# Patient Record
Sex: FEMALE | Race: WHITE | NOT HISPANIC OR LATINO | URBAN - METROPOLITAN AREA
[De-identification: names, ages, dates, MRNs, and addresses within clinical notes are randomized per-mention and may not be internally consistent; named-entity substitution may affect disease eponyms.]

---

## 2018-06-20 ENCOUNTER — EMERGENCY (EMERGENCY)
Facility: HOSPITAL | Age: 78
LOS: 0 days | Discharge: LEFT BEFORE DISCHARGE | End: 2018-06-20
Attending: EMERGENCY MEDICINE | Admitting: EMERGENCY MEDICINE
Payer: COMMERCIAL

## 2018-06-20 VITALS
DIASTOLIC BLOOD PRESSURE: 84 MMHG | TEMPERATURE: 98 F | HEART RATE: 120 BPM | OXYGEN SATURATION: 94 % | SYSTOLIC BLOOD PRESSURE: 193 MMHG | RESPIRATION RATE: 20 BRPM | WEIGHT: 240.08 LBS | HEIGHT: 62 IN

## 2018-06-20 VITALS
HEART RATE: 113 BPM | DIASTOLIC BLOOD PRESSURE: 110 MMHG | SYSTOLIC BLOOD PRESSURE: 209 MMHG | OXYGEN SATURATION: 99 % | RESPIRATION RATE: 18 BRPM

## 2018-06-20 DIAGNOSIS — Z90.710 ACQUIRED ABSENCE OF BOTH CERVIX AND UTERUS: ICD-10-CM

## 2018-06-20 DIAGNOSIS — Z90.710 ACQUIRED ABSENCE OF BOTH CERVIX AND UTERUS: Chronic | ICD-10-CM

## 2018-06-20 DIAGNOSIS — J44.9 CHRONIC OBSTRUCTIVE PULMONARY DISEASE, UNSPECIFIED: ICD-10-CM

## 2018-06-20 DIAGNOSIS — I10 ESSENTIAL (PRIMARY) HYPERTENSION: ICD-10-CM

## 2018-06-20 DIAGNOSIS — M79.89 OTHER SPECIFIED SOFT TISSUE DISORDERS: ICD-10-CM

## 2018-06-20 DIAGNOSIS — E11.65 TYPE 2 DIABETES MELLITUS WITH HYPERGLYCEMIA: ICD-10-CM

## 2018-06-20 DIAGNOSIS — Z87.891 PERSONAL HISTORY OF NICOTINE DEPENDENCE: ICD-10-CM

## 2018-06-20 LAB
ALBUMIN SERPL ELPH-MCNC: 3.8 G/DL — SIGNIFICANT CHANGE UP (ref 3.5–5.2)
ALP SERPL-CCNC: 130 U/L — HIGH (ref 30–115)
ALT FLD-CCNC: 13 U/L — SIGNIFICANT CHANGE UP (ref 0–41)
ANION GAP SERPL CALC-SCNC: 13 MMOL/L — SIGNIFICANT CHANGE UP (ref 7–14)
APPEARANCE UR: CLEAR — SIGNIFICANT CHANGE UP
AST SERPL-CCNC: 16 U/L — SIGNIFICANT CHANGE UP (ref 0–41)
BILIRUB SERPL-MCNC: 1 MG/DL — SIGNIFICANT CHANGE UP (ref 0.2–1.2)
BILIRUB UR-MCNC: NEGATIVE — SIGNIFICANT CHANGE UP
BUN SERPL-MCNC: 19 MG/DL — SIGNIFICANT CHANGE UP (ref 10–20)
CALCIUM SERPL-MCNC: 9.3 MG/DL — SIGNIFICANT CHANGE UP (ref 8.5–10.1)
CHLORIDE SERPL-SCNC: 100 MMOL/L — SIGNIFICANT CHANGE UP (ref 98–110)
CK MB CFR SERPL CALC: 4.6 NG/ML — SIGNIFICANT CHANGE UP (ref 0.6–6.3)
CK SERPL-CCNC: 179 U/L — SIGNIFICANT CHANGE UP (ref 0–225)
CO2 SERPL-SCNC: 25 MMOL/L — SIGNIFICANT CHANGE UP (ref 17–32)
COLOR SPEC: YELLOW — SIGNIFICANT CHANGE UP
CREAT SERPL-MCNC: 1.1 MG/DL — SIGNIFICANT CHANGE UP (ref 0.7–1.5)
DIFF PNL FLD: (no result)
EPI CELLS # UR: (no result) /HPF
GLUCOSE SERPL-MCNC: 227 MG/DL — HIGH (ref 70–99)
GLUCOSE UR QL: 100 MG/DL
HCT VFR BLD CALC: 39.7 % — SIGNIFICANT CHANGE UP (ref 37–47)
HGB BLD-MCNC: 13 G/DL — SIGNIFICANT CHANGE UP (ref 12–16)
KETONES UR-MCNC: NEGATIVE — SIGNIFICANT CHANGE UP
LEUKOCYTE ESTERASE UR-ACNC: NEGATIVE — SIGNIFICANT CHANGE UP
MCHC RBC-ENTMCNC: 30.2 PG — SIGNIFICANT CHANGE UP (ref 27–31)
MCHC RBC-ENTMCNC: 32.7 G/DL — SIGNIFICANT CHANGE UP (ref 32–37)
MCV RBC AUTO: 92.1 FL — SIGNIFICANT CHANGE UP (ref 81–99)
NITRITE UR-MCNC: NEGATIVE — SIGNIFICANT CHANGE UP
NRBC # BLD: 0 /100 WBCS — SIGNIFICANT CHANGE UP (ref 0–0)
NT-PROBNP SERPL-SCNC: 158 PG/ML — SIGNIFICANT CHANGE UP (ref 0–300)
PH UR: 6.5 — SIGNIFICANT CHANGE UP (ref 5–8)
PLATELET # BLD AUTO: 387 K/UL — SIGNIFICANT CHANGE UP (ref 130–400)
POTASSIUM SERPL-MCNC: 3.9 MMOL/L — SIGNIFICANT CHANGE UP (ref 3.5–5)
POTASSIUM SERPL-SCNC: 3.9 MMOL/L — SIGNIFICANT CHANGE UP (ref 3.5–5)
PROT SERPL-MCNC: 7.1 G/DL — SIGNIFICANT CHANGE UP (ref 6–8)
PROT UR-MCNC: NEGATIVE MG/DL — SIGNIFICANT CHANGE UP
RBC # BLD: 4.31 M/UL — SIGNIFICANT CHANGE UP (ref 4.2–5.4)
RBC # FLD: 13.6 % — SIGNIFICANT CHANGE UP (ref 11.5–14.5)
RBC CASTS # UR COMP ASSIST: SIGNIFICANT CHANGE UP /HPF
SODIUM SERPL-SCNC: 138 MMOL/L — SIGNIFICANT CHANGE UP (ref 135–146)
SP GR SPEC: 1.02 — SIGNIFICANT CHANGE UP (ref 1.01–1.03)
TROPONIN T SERPL-MCNC: <0.01 NG/ML — SIGNIFICANT CHANGE UP
UROBILINOGEN FLD QL: 0.2 MG/DL — SIGNIFICANT CHANGE UP (ref 0.2–0.2)
WBC # BLD: 8.18 K/UL — SIGNIFICANT CHANGE UP (ref 4.8–10.8)
WBC # FLD AUTO: 8.18 K/UL — SIGNIFICANT CHANGE UP (ref 4.8–10.8)
WBC UR QL: SIGNIFICANT CHANGE UP /HPF

## 2018-06-20 PROCEDURE — 93970 EXTREMITY STUDY: CPT | Mod: 26

## 2018-06-20 RX ORDER — FUROSEMIDE 40 MG
40 TABLET ORAL ONCE
Qty: 0 | Refills: 0 | Status: COMPLETED | OUTPATIENT
Start: 2018-06-20 | End: 2018-06-20

## 2018-06-20 RX ORDER — ACETAMINOPHEN 500 MG
650 TABLET ORAL ONCE
Qty: 0 | Refills: 0 | Status: DISCONTINUED | OUTPATIENT
Start: 2018-06-20 | End: 2018-06-20

## 2018-06-20 RX ADMIN — Medication 40 MILLIGRAM(S): at 18:17

## 2018-06-20 NOTE — ED ADULT NURSE NOTE - BREATHING, MLM
VSS. Discharge instructions reviewed with patient and wife and copy of instructions sent home with patient. Dr. Roxanne Fox spoke with patient and wife prior to discharge. Questions answered. Discharged via car, wheeled out by [employer]. IV discontinued prior to discharge.      Personal items with patient at discharge: clothing
Spontaneous, unlabored and symmetrical

## 2018-06-20 NOTE — ED ADULT NURSE NOTE - EXPLANATION OF PATIENT'S REASON FOR LEAVING
patient was recommended to be admitted, patient stated to medical team that she does not want to be admitted, daughter at bedside

## 2018-06-20 NOTE — ED PROVIDER NOTE - PROGRESS NOTE DETAILS
advised to follow up patient and family regarding her hyperglycemia, given copies of results.    We have discussed indications to return. Patient persistently tachycardic. Discussed admission for monitoring. Patient refuses to stay. Wishes to leave AMA. Aware of risk of severe illness including death.

## 2018-06-20 NOTE — ED PROVIDER NOTE - PHYSICAL EXAMINATION
Gen: Alert, NAD, well appearing  Head: NC, AT, PERRL, EOMI, normal lids/conjunctiva  Neck: +supple, no tenderness/meningismus,  Pulm: CTAB  Heart: tachy, +murmur  Abd: soft, NT/ND, +BS  Mskel: +swelling b/l lower extremities. No calf tendneress.  No erythema/cyanosis  Skin: no rash, warm/dry  Neuro: AAOx3, no sensory/motor deficits

## 2018-06-20 NOTE — ED ADULT NURSE NOTE - ED STAT RN HANDOFF DETAILS
Vital signs reviewed with MD Patiño and ALESSIA Faustin, aware of hypertension and tachycardia, patient was seen by medical team and offered admission patient states she wants to sign against medical advise, patient was educated on risks associated with doing so, verbalized understanding but continued AMA

## 2018-06-20 NOTE — ED PROVIDER NOTE - NS ED ROS FT
Review of Systems    Constitutional: (-) fever  Eyes/ENT: (-) blurry vision, (-) epistaxis  Cardiovascular: (-) chest pain, (-) syncope  Respiratory: (-) cough, (-) shortness of breath  Gastrointestinal: (-) vomiting, (-) diarrhea  Musculoskeletal: (+) leg swelling (-) neck pain, (-) back pain, (-) joint pain  Integumentary: (-) rash, (-) edema  Neurological: (-) headache, (-) altered mental status

## 2018-06-20 NOTE — ED PROVIDER NOTE - ATTENDING CONTRIBUTION TO CARE
I personally evaluated the patient. I reviewed the Resident’s or Physician Assistant’s note (as assigned above), and agree with the findings and plan except as documented in my note. 76yo F Pt comes in c/o b/l leg swelling. Pt has been on her feel doing chores and noticed that her legs were swollen. Pt denies any CP, SOB, fever, chills. b/l pitting edema to level of knee, chest clear On exam: NCAT. PERRLA, EOMI. OP clear. Lungs CTAB. RRR, S1S2 noted. Radial pulses 2+ and equal, pedal pulses 2+ and equal. Abdomen soft, NT/ND, no rebound or guarding. FROM x4 extremities. No focal neuro deficits. Plan: CXR, Labs, diuretics

## 2018-06-20 NOTE — ED PROVIDER NOTE - OBJECTIVE STATEMENT
hx from patient and daughter  76 yo female  hx of COPD, HTN, schizophrenia c/o increasing leg swelling. Patient has had leg swelling for months and noticed increased swelling to legs. No fevers, CP, SOB, or abdominal pains.

## 2018-09-22 ENCOUNTER — EMERGENCY (EMERGENCY)
Facility: HOSPITAL | Age: 78
LOS: 0 days | Discharge: HOME | End: 2018-09-22
Attending: EMERGENCY MEDICINE | Admitting: EMERGENCY MEDICINE

## 2018-09-22 VITALS
SYSTOLIC BLOOD PRESSURE: 195 MMHG | HEART RATE: 98 BPM | HEIGHT: 62 IN | TEMPERATURE: 98 F | RESPIRATION RATE: 18 BRPM | DIASTOLIC BLOOD PRESSURE: 117 MMHG | WEIGHT: 240.08 LBS | OXYGEN SATURATION: 97 %

## 2018-09-22 VITALS — DIASTOLIC BLOOD PRESSURE: 98 MMHG | SYSTOLIC BLOOD PRESSURE: 147 MMHG

## 2018-09-22 DIAGNOSIS — Z90.710 ACQUIRED ABSENCE OF BOTH CERVIX AND UTERUS: ICD-10-CM

## 2018-09-22 DIAGNOSIS — Z90.710 ACQUIRED ABSENCE OF BOTH CERVIX AND UTERUS: Chronic | ICD-10-CM

## 2018-09-22 DIAGNOSIS — J44.9 CHRONIC OBSTRUCTIVE PULMONARY DISEASE, UNSPECIFIED: ICD-10-CM

## 2018-09-22 DIAGNOSIS — I10 ESSENTIAL (PRIMARY) HYPERTENSION: ICD-10-CM

## 2018-09-22 DIAGNOSIS — R42 DIZZINESS AND GIDDINESS: ICD-10-CM

## 2018-09-22 DIAGNOSIS — F20.9 SCHIZOPHRENIA, UNSPECIFIED: ICD-10-CM

## 2018-09-22 DIAGNOSIS — Z79.899 OTHER LONG TERM (CURRENT) DRUG THERAPY: ICD-10-CM

## 2018-09-22 DIAGNOSIS — R60.0 LOCALIZED EDEMA: ICD-10-CM

## 2018-09-22 PROBLEM — F25.0 SCHIZOAFFECTIVE DISORDER, BIPOLAR TYPE: Chronic | Status: ACTIVE | Noted: 2018-06-20

## 2018-09-22 LAB
ALBUMIN SERPL ELPH-MCNC: 4.5 G/DL — SIGNIFICANT CHANGE UP (ref 3.5–5.2)
ALP SERPL-CCNC: 109 U/L — SIGNIFICANT CHANGE UP (ref 30–115)
ALT FLD-CCNC: 15 U/L — SIGNIFICANT CHANGE UP (ref 0–41)
ANION GAP SERPL CALC-SCNC: 14 MMOL/L — SIGNIFICANT CHANGE UP (ref 7–14)
APPEARANCE UR: CLEAR — SIGNIFICANT CHANGE UP
AST SERPL-CCNC: 23 U/L — SIGNIFICANT CHANGE UP (ref 0–41)
BILIRUB SERPL-MCNC: 0.5 MG/DL — SIGNIFICANT CHANGE UP (ref 0.2–1.2)
BILIRUB UR-MCNC: NEGATIVE — SIGNIFICANT CHANGE UP
BUN SERPL-MCNC: 16 MG/DL — SIGNIFICANT CHANGE UP (ref 10–20)
CALCIUM SERPL-MCNC: 10.1 MG/DL — SIGNIFICANT CHANGE UP (ref 8.5–10.1)
CHLORIDE SERPL-SCNC: 97 MMOL/L — LOW (ref 98–110)
CO2 SERPL-SCNC: 27 MMOL/L — SIGNIFICANT CHANGE UP (ref 17–32)
COLOR SPEC: YELLOW — SIGNIFICANT CHANGE UP
CREAT SERPL-MCNC: 1.1 MG/DL — SIGNIFICANT CHANGE UP (ref 0.7–1.5)
DIFF PNL FLD: ABNORMAL
EPI CELLS # UR: ABNORMAL /HPF
GLUCOSE SERPL-MCNC: 127 MG/DL — HIGH (ref 70–99)
GLUCOSE UR QL: NEGATIVE MG/DL — SIGNIFICANT CHANGE UP
HCT VFR BLD CALC: 44.8 % — SIGNIFICANT CHANGE UP (ref 37–47)
HGB BLD-MCNC: 14.3 G/DL — SIGNIFICANT CHANGE UP (ref 12–16)
KETONES UR-MCNC: NEGATIVE — SIGNIFICANT CHANGE UP
LEUKOCYTE ESTERASE UR-ACNC: ABNORMAL
MCHC RBC-ENTMCNC: 29.5 PG — SIGNIFICANT CHANGE UP (ref 27–31)
MCHC RBC-ENTMCNC: 31.9 G/DL — LOW (ref 32–37)
MCV RBC AUTO: 92.4 FL — SIGNIFICANT CHANGE UP (ref 81–99)
NITRITE UR-MCNC: NEGATIVE — SIGNIFICANT CHANGE UP
NRBC # BLD: 0 /100 WBCS — SIGNIFICANT CHANGE UP (ref 0–0)
NT-PROBNP SERPL-SCNC: 170 PG/ML — SIGNIFICANT CHANGE UP (ref 0–300)
PH UR: 7 — SIGNIFICANT CHANGE UP (ref 5–8)
PLATELET # BLD AUTO: 436 K/UL — HIGH (ref 130–400)
POTASSIUM SERPL-MCNC: 4.6 MMOL/L — SIGNIFICANT CHANGE UP (ref 3.5–5)
POTASSIUM SERPL-SCNC: 4.6 MMOL/L — SIGNIFICANT CHANGE UP (ref 3.5–5)
PROT SERPL-MCNC: 8.1 G/DL — HIGH (ref 6–8)
PROT UR-MCNC: NEGATIVE MG/DL — SIGNIFICANT CHANGE UP
RBC # BLD: 4.85 M/UL — SIGNIFICANT CHANGE UP (ref 4.2–5.4)
RBC # FLD: 14.4 % — SIGNIFICANT CHANGE UP (ref 11.5–14.5)
RBC CASTS # UR COMP ASSIST: ABNORMAL /HPF
SODIUM SERPL-SCNC: 138 MMOL/L — SIGNIFICANT CHANGE UP (ref 135–146)
SP GR SPEC: 1.01 — SIGNIFICANT CHANGE UP (ref 1.01–1.03)
TROPONIN T SERPL-MCNC: <0.01 NG/ML — SIGNIFICANT CHANGE UP
UROBILINOGEN FLD QL: 0.2 MG/DL — SIGNIFICANT CHANGE UP (ref 0.2–0.2)
WBC # BLD: 8.58 K/UL — SIGNIFICANT CHANGE UP (ref 4.8–10.8)
WBC # FLD AUTO: 8.58 K/UL — SIGNIFICANT CHANGE UP (ref 4.8–10.8)
WBC UR QL: SIGNIFICANT CHANGE UP /HPF

## 2018-09-22 RX ORDER — FLUPHENAZINE HYDROCHLORIDE 1 MG/1
37.5 TABLET, FILM COATED ORAL ONCE
Qty: 0 | Refills: 0 | Status: COMPLETED | OUTPATIENT
Start: 2018-09-22 | End: 2018-09-22

## 2018-09-22 RX ORDER — METOPROLOL TARTRATE 50 MG
50 TABLET ORAL ONCE
Qty: 0 | Refills: 0 | Status: COMPLETED | OUTPATIENT
Start: 2018-09-22 | End: 2018-09-22

## 2018-09-22 RX ADMIN — Medication 50 MILLIGRAM(S): at 15:52

## 2018-09-22 RX ADMIN — FLUPHENAZINE HYDROCHLORIDE 37.5 MILLIGRAM(S): 1 TABLET, FILM COATED ORAL at 15:52

## 2018-09-22 NOTE — ED ADULT NURSE NOTE - NSIMPLEMENTINTERV_GEN_ALL_ED
Implemented All Universal Safety Interventions:  [place] to call system. Call bell, personal items and telephone within reach. Instruct patient to call for assistance. Room bathroom lighting operational. Non-slip footwear when patient is off stretcher. Physically safe environment: no spills, clutter or unnecessary equipment. Stretcher in lowest position, wheels locked, appropriate side rails in place.

## 2018-09-22 NOTE — ED PROVIDER NOTE - ATTENDING CONTRIBUTION TO CARE
I have personally performed a history and physical exam on this patient and personally directed the management of the patient. Patient presents for evaluation of dizziness, she has a history of shizophrenia and her history is unreliable information corroborated by her daughter, she denies any fevers or chills she denies any vomiting she franc any headache, visual changes or focal deficits, her daughters main concern is the patient non-compliance with medication as well as refusal to take.  On physical exam the patient is nc/at perrla eomi oropharynx clear cta b/l, rrr s1s2 noted abd-soft nt ndbs+e xt from with no edema noted. she has no focal deficits she is able to ambulate well.  A/P- we obtained labs ct head which is negative we have treated her bp with po medication and it has responded well the patient improved here in the Ed I will discharge we discussed indications to return to the Ed and overall have stressed a low thrshold for return

## 2018-09-22 NOTE — ED PROVIDER NOTE - OBJECTIVE STATEMENT
hx from patient and daughter  77 yo female hx of schizophrenia, COPD brought in by daughter after patient c/o of lightheaded, dizzy, and feeling off balance today. Patient also with edema to her legs. Patient is non compliant with meds. Patient denies any symptoms. No CP, SOB, HA, dizzy, or abdominal pains.

## 2018-09-22 NOTE — ED PROVIDER NOTE - PROGRESS NOTE DETAILS
Per daughter patient non compliant with Resperidol, last dose 2 weeks ago. Dr. Patiño spoke with patient's doctor who recommended Prolixin 37.5mg IM

## 2018-09-22 NOTE — ED PROVIDER NOTE - NS ED ROS FT
Review of Systems    Constitutional: (-) fever  Eyes/ENT: (-) blurry vision, (-) epistaxis  Cardiovascular: (-) chest pain, (-) syncope  Respiratory: (-) cough, (-) shortness of breath  Gastrointestinal: (-) vomiting, (-) diarrhea  Musculoskeletal: (-) neck pain, (-) back pain, (-) joint pain  Integumentary: (-) rash, (-) edema  Neurological: (-) headache, (-) altered mental status, (+) dizzy, (+) offbalance  Psychiatric: (-) hallucinations  Allergic/Immunologic: (-) pruritus

## 2018-09-22 NOTE — ED PROVIDER NOTE - PHYSICAL EXAMINATION
Gen: Alert, NAD, well appearing  Head: NC, AT, PERRL, EOMI, normal lids/conjunctiva  ENT: normal hearing, patent oropharynx   Neck: +supple, no tenderness/meningismus,  Pulm: Bilateral BS, normal resp effort, no wheeze/stridor/retractions  CV: S1S2, RRR  Abd: soft, NT/ND  Mskel: +2 swelling b/l LE. No erythema/cyanosis  Skin: no rash, warm/dry  Neuro: AAOx3, no sensory/motor deficits

## 2019-07-07 ENCOUNTER — INPATIENT (INPATIENT)
Facility: HOSPITAL | Age: 79
LOS: 7 days | Discharge: HOME HEALTH | End: 2019-07-15
Attending: INTERNAL MEDICINE | Admitting: INTERNAL MEDICINE
Payer: MEDICARE

## 2019-07-07 VITALS
HEART RATE: 110 BPM | OXYGEN SATURATION: 96 % | RESPIRATION RATE: 20 BRPM | TEMPERATURE: 99 F | DIASTOLIC BLOOD PRESSURE: 74 MMHG | SYSTOLIC BLOOD PRESSURE: 164 MMHG

## 2019-07-07 DIAGNOSIS — Z90.710 ACQUIRED ABSENCE OF BOTH CERVIX AND UTERUS: Chronic | ICD-10-CM

## 2019-07-07 LAB
ALBUMIN SERPL ELPH-MCNC: 3.5 G/DL — SIGNIFICANT CHANGE UP (ref 3.5–5.2)
ALBUMIN SERPL ELPH-MCNC: 3.7 G/DL — SIGNIFICANT CHANGE UP (ref 3.5–5.2)
ALP SERPL-CCNC: 127 U/L — HIGH (ref 30–115)
ALP SERPL-CCNC: 136 U/L — HIGH (ref 30–115)
ALT FLD-CCNC: 127 U/L — HIGH (ref 0–41)
ALT FLD-CCNC: 133 U/L — HIGH (ref 0–41)
ANION GAP SERPL CALC-SCNC: 16 MMOL/L — HIGH (ref 7–14)
ANION GAP SERPL CALC-SCNC: 17 MMOL/L — HIGH (ref 7–14)
APPEARANCE UR: ABNORMAL
APTT BLD: 28.7 SEC — SIGNIFICANT CHANGE UP (ref 27–39.2)
AST SERPL-CCNC: 107 U/L — HIGH (ref 0–41)
AST SERPL-CCNC: 118 U/L — HIGH (ref 0–41)
BASOPHILS # BLD AUTO: 0.03 K/UL — SIGNIFICANT CHANGE UP (ref 0–0.2)
BASOPHILS NFR BLD AUTO: 0.2 % — SIGNIFICANT CHANGE UP (ref 0–1)
BILIRUB SERPL-MCNC: 1.3 MG/DL — HIGH (ref 0.2–1.2)
BILIRUB SERPL-MCNC: 1.4 MG/DL — HIGH (ref 0.2–1.2)
BILIRUB UR-MCNC: ABNORMAL
BUN SERPL-MCNC: 22 MG/DL — HIGH (ref 10–20)
BUN SERPL-MCNC: 22 MG/DL — HIGH (ref 10–20)
CALCIUM SERPL-MCNC: 8.8 MG/DL — SIGNIFICANT CHANGE UP (ref 8.5–10.1)
CALCIUM SERPL-MCNC: 9.3 MG/DL — SIGNIFICANT CHANGE UP (ref 8.5–10.1)
CHLORIDE SERPL-SCNC: 101 MMOL/L — SIGNIFICANT CHANGE UP (ref 98–110)
CHLORIDE SERPL-SCNC: 98 MMOL/L — SIGNIFICANT CHANGE UP (ref 98–110)
CO2 SERPL-SCNC: 20 MMOL/L — SIGNIFICANT CHANGE UP (ref 17–32)
CO2 SERPL-SCNC: 24 MMOL/L — SIGNIFICANT CHANGE UP (ref 17–32)
COLOR SPEC: ABNORMAL
CREAT SERPL-MCNC: 1.6 MG/DL — HIGH (ref 0.7–1.5)
CREAT SERPL-MCNC: 1.6 MG/DL — HIGH (ref 0.7–1.5)
DIFF PNL FLD: NEGATIVE — SIGNIFICANT CHANGE UP
EOSINOPHIL # BLD AUTO: 0.03 K/UL — SIGNIFICANT CHANGE UP (ref 0–0.7)
EOSINOPHIL NFR BLD AUTO: 0.2 % — SIGNIFICANT CHANGE UP (ref 0–8)
GLUCOSE SERPL-MCNC: 134 MG/DL — HIGH (ref 70–99)
GLUCOSE SERPL-MCNC: 93 MG/DL — SIGNIFICANT CHANGE UP (ref 70–99)
GLUCOSE UR QL: NEGATIVE MG/DL — SIGNIFICANT CHANGE UP
HCT VFR BLD CALC: 44.5 % — SIGNIFICANT CHANGE UP (ref 37–47)
HGB BLD-MCNC: 14.5 G/DL — SIGNIFICANT CHANGE UP (ref 12–16)
IMM GRANULOCYTES NFR BLD AUTO: 0.7 % — HIGH (ref 0.1–0.3)
INR BLD: 1.31 RATIO — HIGH (ref 0.65–1.3)
KETONES UR-MCNC: ABNORMAL
LACTATE BLDV-MCNC: 1.6 MMOL/L — SIGNIFICANT CHANGE UP (ref 0.5–1.6)
LEUKOCYTE ESTERASE UR-ACNC: ABNORMAL
LIDOCAIN IGE QN: 10 U/L — SIGNIFICANT CHANGE UP (ref 7–60)
LYMPHOCYTES # BLD AUTO: 0.59 K/UL — LOW (ref 1.2–3.4)
LYMPHOCYTES # BLD AUTO: 4.9 % — LOW (ref 20.5–51.1)
MCHC RBC-ENTMCNC: 30.1 PG — SIGNIFICANT CHANGE UP (ref 27–31)
MCHC RBC-ENTMCNC: 32.6 G/DL — SIGNIFICANT CHANGE UP (ref 32–37)
MCV RBC AUTO: 92.5 FL — SIGNIFICANT CHANGE UP (ref 81–99)
MONOCYTES # BLD AUTO: 0.5 K/UL — SIGNIFICANT CHANGE UP (ref 0.1–0.6)
MONOCYTES NFR BLD AUTO: 4.1 % — SIGNIFICANT CHANGE UP (ref 1.7–9.3)
NEUTROPHILS # BLD AUTO: 10.82 K/UL — HIGH (ref 1.4–6.5)
NEUTROPHILS NFR BLD AUTO: 89.9 % — HIGH (ref 42.2–75.2)
NITRITE UR-MCNC: NEGATIVE — SIGNIFICANT CHANGE UP
NRBC # BLD: 0 /100 WBCS — SIGNIFICANT CHANGE UP (ref 0–0)
PH UR: 5.5 — SIGNIFICANT CHANGE UP (ref 5–8)
PLATELET # BLD AUTO: 219 K/UL — SIGNIFICANT CHANGE UP (ref 130–400)
POTASSIUM SERPL-MCNC: 3.5 MMOL/L — SIGNIFICANT CHANGE UP (ref 3.5–5)
POTASSIUM SERPL-MCNC: 4.4 MMOL/L — SIGNIFICANT CHANGE UP (ref 3.5–5)
POTASSIUM SERPL-SCNC: 3.5 MMOL/L — SIGNIFICANT CHANGE UP (ref 3.5–5)
POTASSIUM SERPL-SCNC: 4.4 MMOL/L — SIGNIFICANT CHANGE UP (ref 3.5–5)
PROT SERPL-MCNC: 7.2 G/DL — SIGNIFICANT CHANGE UP (ref 6–8)
PROT SERPL-MCNC: 7.3 G/DL — SIGNIFICANT CHANGE UP (ref 6–8)
PROT UR-MCNC: 30 MG/DL
PROTHROM AB SERPL-ACNC: 15 SEC — HIGH (ref 9.95–12.87)
RBC # BLD: 4.81 M/UL — SIGNIFICANT CHANGE UP (ref 4.2–5.4)
RBC # FLD: 14.7 % — HIGH (ref 11.5–14.5)
SODIUM SERPL-SCNC: 137 MMOL/L — SIGNIFICANT CHANGE UP (ref 135–146)
SODIUM SERPL-SCNC: 139 MMOL/L — SIGNIFICANT CHANGE UP (ref 135–146)
SP GR SPEC: 1.02 — SIGNIFICANT CHANGE UP (ref 1.01–1.03)
TROPONIN T SERPL-MCNC: 0.03 NG/ML — CRITICAL HIGH
TROPONIN T SERPL-MCNC: <0.01 NG/ML — SIGNIFICANT CHANGE UP
UROBILINOGEN FLD QL: 1 MG/DL (ref 0.2–0.2)
WBC # BLD: 12.06 K/UL — HIGH (ref 4.8–10.8)
WBC # FLD AUTO: 12.06 K/UL — HIGH (ref 4.8–10.8)

## 2019-07-07 PROCEDURE — 93010 ELECTROCARDIOGRAM REPORT: CPT | Mod: 76

## 2019-07-07 PROCEDURE — 71045 X-RAY EXAM CHEST 1 VIEW: CPT | Mod: 26

## 2019-07-07 PROCEDURE — 99291 CRITICAL CARE FIRST HOUR: CPT

## 2019-07-07 PROCEDURE — 99283 EMERGENCY DEPT VISIT LOW MDM: CPT

## 2019-07-07 PROCEDURE — 74177 CT ABD & PELVIS W/CONTRAST: CPT | Mod: 26

## 2019-07-07 PROCEDURE — 70450 CT HEAD/BRAIN W/O DYE: CPT | Mod: 26

## 2019-07-07 RX ORDER — CEFTRIAXONE 500 MG/1
1000 INJECTION, POWDER, FOR SOLUTION INTRAMUSCULAR; INTRAVENOUS ONCE
Refills: 0 | Status: COMPLETED | OUTPATIENT
Start: 2019-07-07 | End: 2019-07-07

## 2019-07-07 RX ORDER — ACETAMINOPHEN 500 MG
650 TABLET ORAL ONCE
Refills: 0 | Status: COMPLETED | OUTPATIENT
Start: 2019-07-07 | End: 2019-07-07

## 2019-07-07 RX ORDER — SODIUM CHLORIDE 9 MG/ML
2000 INJECTION INTRAMUSCULAR; INTRAVENOUS; SUBCUTANEOUS ONCE
Refills: 0 | Status: COMPLETED | OUTPATIENT
Start: 2019-07-07 | End: 2019-07-07

## 2019-07-07 RX ADMIN — CEFTRIAXONE 1000 MILLIGRAM(S): 500 INJECTION, POWDER, FOR SOLUTION INTRAMUSCULAR; INTRAVENOUS at 17:42

## 2019-07-07 RX ADMIN — Medication 650 MILLIGRAM(S): at 17:38

## 2019-07-07 RX ADMIN — Medication 650 MILLIGRAM(S): at 16:19

## 2019-07-07 RX ADMIN — CEFTRIAXONE 100 MILLIGRAM(S): 500 INJECTION, POWDER, FOR SOLUTION INTRAMUSCULAR; INTRAVENOUS at 16:19

## 2019-07-07 RX ADMIN — SODIUM CHLORIDE 2000 MILLILITER(S): 9 INJECTION INTRAMUSCULAR; INTRAVENOUS; SUBCUTANEOUS at 17:38

## 2019-07-07 RX ADMIN — SODIUM CHLORIDE 2000 MILLILITER(S): 9 INJECTION INTRAMUSCULAR; INTRAVENOUS; SUBCUTANEOUS at 16:16

## 2019-07-07 NOTE — ED PROVIDER NOTE - PHYSICAL EXAMINATION
Gen: NAD, AOx3  Head: NCAT  HEENT: oral mucosa moist, normal conjunctiva, oropharynx clear without exudate or erythema  Lung: CTAB, no respiratory distress, no wheezing, rales, rhonchi  CV: normal s1/s2, rrr, Normal perfusion, pulses 2+ throughout  Abd: soft, diffuse tenderness, no CVA tenderness  Genitourinary: no pelvic tenderness  MSK: No edema, no visible deformities, full range of motion in all 4 extremities  Neuro: CN II-XII grossly intact, No focal neurologic deficits  Skin: No rash   Psych: normal affect

## 2019-07-07 NOTE — ED PROVIDER NOTE - NS ED ROS FT
Constitutional: (-) fever  Eyes/ENT: (-) visual changes   Cardiovascular: (-) chest pain, (-) syncope  Respiratory: (-) cough, (-) shortness of breath  Gastrointestinal: (-) vomiting, (+) diarrhea, abdominal pain  Genitourinary: (-) dysuria, (-) hesitancy, (-) frequency   Musculoskeletal: (-) neck pain, (-) back pain, (-) joint pain  Integumentary: (-) rash, (-) edema  Neurological: (-) headache, (-) altered mental status  Allergic/Immunologic: (-) pruritus

## 2019-07-07 NOTE — ED PROVIDER NOTE - CRITICAL CARE PROVIDED
interpretation of diagnostic studies/consult w/ pt's family directly relating to pts condition/additional history taking/documentation/direct patient care (not related to procedure)

## 2019-07-07 NOTE — ED PROVIDER NOTE - OBJECTIVE STATEMENT
79 yo female pmh of HTN, COPD, and schizophrenia presents for progressive weakness. pt is accompanied by daughter that states that she has been ambulating less a well having a decrease in appetite for the past week. pt had one episode of diarrhea 2 days prior and states to intermittently feel a throbbing pain in her stomach. she also has had intermittent nausea but no vomiting and has been able to tolerate PO. she denies any fevers, chills, chest pain, or SOB.

## 2019-07-07 NOTE — ED PROVIDER NOTE - PROGRESS NOTE DETAILS
I reassessed the patient, reviewed vital signs. <2 sec cap refill, 2+ radial pulse, skin warm and dry. I reassessed the patient, reviewed vital signs. <2 sec cap refill, 2+ radial pulse, skin warm and dry. Of note, given 30/kg IDEAL BODY WEIGHT fluid bolus due to concern for overloading the patient with regular bolus. Signed off care to Dr. ERIK Lu who will f/u CT and admit. consulted surgery who will come to see the pt.

## 2019-07-07 NOTE — ED PROVIDER NOTE - CLINICAL SUMMARY MEDICAL DECISION MAKING FREE TEXT BOX
pt admitted for further treatment suspected sepsis, CXR NAPD, CT A/P without any acute findings, admitted to telemetry for further treatment weakness, abdominal pain, elevated troponin & increased LFTS

## 2019-07-07 NOTE — ED ADULT NURSE NOTE - INTERVENTIONS DEFINITIONS
Call bell, personal items and telephone within reach/Provide visual cue, wrist band, yellow gown, etc./Non-slip footwear when patient is off stretcher/Physically safe environment: no spills, clutter or unnecessary equipment/Stretcher in lowest position, wheels locked, appropriate side rails in place/Monitor for mental status changes and reorient to person, place, and time/[place] to call system/Monitor gait and stability

## 2019-07-07 NOTE — ED PROVIDER NOTE - ATTENDING CONTRIBUTION TO CARE
78yoF with h/o COPD, HTN, schizophrenia (on risperidone 4mg BID only - no other meds), presents with weakness and decreased ambulation for the past week. Pt herself states she has been dizzy. Reports abd pain when palpated. Had some diarrhea 2 days ago. She and family deny all other symptoms including change in her usual mentation, fall or other trauma, vomiting, hematochezia, dysuria, or any other symptoms. On exam, afebrile, hemodynamically stable, saturating well, NAD, dishevelled, non toxic appearing, head NCAT, EOMI, anicteric, MM dry, no JVD, RRR, nml S1/S2, no m/r/g, lungs CTAB, no w/r/r, abd soft, NT though begins reporting tenderness when asked, ND, nml BS, no rebound or guarding, AAO, CN's 3-12 grossly intact, motor 5/5 in all extrems, F-N nml, no horiz/vert/rot nystagmus, ALTAMIRANO spontaneously, chronic symmetric LE skin thickening, skin warm, dry, noted fungal infection of her pannus/skin folds. Character low suspicion for CVA and no localizing signs. No signs of ICH. No arrhythmia. No acute anemia or bleeding. No CP/SOB to suggest ACS or PE. 78yoF with h/o COPD, HTN, schizophrenia (on risperidone 4mg BID only - no other meds), presents with weakness and decreased ambulation for the past week. Pt herself states she has been dizzy. Reports abd pain when palpated. Had some diarrhea 2 days ago. She and family deny all other symptoms including change in her usual mentation, fall or other trauma, vomiting, hematochezia, dysuria, or any other symptoms. On exam, afebrile, hemodynamically stable, saturating well, NAD, dishevelled, non toxic appearing, head NCAT, EOMI, anicteric, MM dry, no JVD, RRR, nml S1/S2, no m/r/g, lungs CTAB, no w/r/r, abd soft, NT though begins reporting tenderness when asked, ND, nml BS, no rebound or guarding, AAO, CN's 3-12 grossly intact, motor 5/5 in all extrems, F-N nml, no horiz/vert/rot nystagmus, ALTAMIRANO spontaneously, chronic symmetric LE skin thickening, skin warm, dry, noted fungal infection of her pannus/skin folds. Character low suspicion for CVA and no localizing signs. No signs of ICH. No arrhythmia. No acute anemia or bleeding. No CP/SOB to suggest ACS or PE, though noted positive trop, with highest suspicion for demand ischemia, no acute ECG changes to suggest STEMI. 78yoF with h/o COPD, HTN, schizophrenia (on risperidone 4mg BID only - no other meds), presents with weakness and decreased ambulation for the past week. Pt herself states she has been dizzy. Reports abd pain when palpated. Had some diarrhea 2 days ago. She and family deny all other symptoms including change in her usual mentation, fall or other trauma, vomiting, hematochezia, dysuria, or any other symptoms. On exam, afebrile, hemodynamically stable, saturating well, NAD, dishevelled, non toxic appearing, head NCAT, EOMI, anicteric, MM dry, no JVD, RRR, nml S1/S2, no m/r/g, lungs CTAB, no w/r/r, abd soft, NT though begins reporting tenderness when asked, ND, nml BS, no rebound or guarding, AAO, CN's 3-12 grossly intact, motor 5/5 in all extrems, F-N nml, no horiz/vert/rot nystagmus, ALTAMIRANO spontaneously, chronic symmetric LE skin thickening, skin warm, dry, noted fungal infection of her pannus/skin folds. Character low suspicion for CVA and no localizing signs. No signs of ICH. No arrhythmia. No acute anemia or bleeding. No CP/SOB to suggest ACS or PE, though noted positive trop, with highest suspicion for demand ischemia, no acute ECG changes to suggest STEMI. Noted LFT abnormalities, awaiting CT. Signed off care to Dr. ERIK Lu who will f/u CT and admit.

## 2019-07-08 DIAGNOSIS — R09.89 OTHER SPECIFIED SYMPTOMS AND SIGNS INVOLVING THE CIRCULATORY AND RESPIRATORY SYSTEMS: ICD-10-CM

## 2019-07-08 LAB
ALBUMIN SERPL ELPH-MCNC: 3.6 G/DL — SIGNIFICANT CHANGE UP (ref 3.5–5.2)
ALP SERPL-CCNC: 120 U/L — HIGH (ref 30–115)
ALT FLD-CCNC: 83 U/L — HIGH (ref 0–41)
ANION GAP SERPL CALC-SCNC: 17 MMOL/L — HIGH (ref 7–14)
APTT BLD: 31.3 SEC — SIGNIFICANT CHANGE UP (ref 27–39.2)
AST SERPL-CCNC: 49 U/L — HIGH (ref 0–41)
BASOPHILS # BLD AUTO: 0.02 K/UL — SIGNIFICANT CHANGE UP (ref 0–0.2)
BASOPHILS NFR BLD AUTO: 0.2 % — SIGNIFICANT CHANGE UP (ref 0–1)
BILIRUB SERPL-MCNC: 0.8 MG/DL — SIGNIFICANT CHANGE UP (ref 0.2–1.2)
BUN SERPL-MCNC: 16 MG/DL — SIGNIFICANT CHANGE UP (ref 10–20)
CALCIUM SERPL-MCNC: 9.2 MG/DL — SIGNIFICANT CHANGE UP (ref 8.5–10.1)
CHLORIDE SERPL-SCNC: 100 MMOL/L — SIGNIFICANT CHANGE UP (ref 98–110)
CO2 SERPL-SCNC: 20 MMOL/L — SIGNIFICANT CHANGE UP (ref 17–32)
CREAT SERPL-MCNC: 1 MG/DL — SIGNIFICANT CHANGE UP (ref 0.7–1.5)
CULTURE RESULTS: NO GROWTH — SIGNIFICANT CHANGE UP
E COLI DNA BLD POS QL NAA+NON-PROBE: SIGNIFICANT CHANGE UP
EOSINOPHIL # BLD AUTO: 0 K/UL — SIGNIFICANT CHANGE UP (ref 0–0.7)
EOSINOPHIL NFR BLD AUTO: 0 % — SIGNIFICANT CHANGE UP (ref 0–8)
GLUCOSE SERPL-MCNC: 138 MG/DL — HIGH (ref 70–99)
GRAM STN FLD: SIGNIFICANT CHANGE UP
GRAM STN FLD: SIGNIFICANT CHANGE UP
HCT VFR BLD CALC: 45.3 % — SIGNIFICANT CHANGE UP (ref 37–47)
HGB BLD-MCNC: 14.8 G/DL — SIGNIFICANT CHANGE UP (ref 12–16)
IMM GRANULOCYTES NFR BLD AUTO: 0.6 % — HIGH (ref 0.1–0.3)
INR BLD: 1.18 RATIO — SIGNIFICANT CHANGE UP (ref 0.65–1.3)
LYMPHOCYTES # BLD AUTO: 0.34 K/UL — LOW (ref 1.2–3.4)
LYMPHOCYTES # BLD AUTO: 4.2 % — LOW (ref 20.5–51.1)
MAGNESIUM SERPL-MCNC: 1.9 MG/DL — SIGNIFICANT CHANGE UP (ref 1.8–2.4)
MCHC RBC-ENTMCNC: 30.3 PG — SIGNIFICANT CHANGE UP (ref 27–31)
MCHC RBC-ENTMCNC: 32.7 G/DL — SIGNIFICANT CHANGE UP (ref 32–37)
MCV RBC AUTO: 92.6 FL — SIGNIFICANT CHANGE UP (ref 81–99)
METHOD TYPE: SIGNIFICANT CHANGE UP
MONOCYTES # BLD AUTO: 0.37 K/UL — SIGNIFICANT CHANGE UP (ref 0.1–0.6)
MONOCYTES NFR BLD AUTO: 4.5 % — SIGNIFICANT CHANGE UP (ref 1.7–9.3)
NEUTROPHILS # BLD AUTO: 7.41 K/UL — HIGH (ref 1.4–6.5)
NEUTROPHILS NFR BLD AUTO: 90.5 % — HIGH (ref 42.2–75.2)
NRBC # BLD: 0 /100 WBCS — SIGNIFICANT CHANGE UP (ref 0–0)
PLATELET # BLD AUTO: 188 K/UL — SIGNIFICANT CHANGE UP (ref 130–400)
POTASSIUM SERPL-MCNC: 3.9 MMOL/L — SIGNIFICANT CHANGE UP (ref 3.5–5)
POTASSIUM SERPL-SCNC: 3.9 MMOL/L — SIGNIFICANT CHANGE UP (ref 3.5–5)
PROT SERPL-MCNC: 7.3 G/DL — SIGNIFICANT CHANGE UP (ref 6–8)
PROTHROM AB SERPL-ACNC: 13.6 SEC — HIGH (ref 9.95–12.87)
RBC # BLD: 4.89 M/UL — SIGNIFICANT CHANGE UP (ref 4.2–5.4)
RBC # FLD: 14.7 % — HIGH (ref 11.5–14.5)
SODIUM SERPL-SCNC: 137 MMOL/L — SIGNIFICANT CHANGE UP (ref 135–146)
SPECIMEN SOURCE: SIGNIFICANT CHANGE UP
SPECIMEN SOURCE: SIGNIFICANT CHANGE UP
TROPONIN T SERPL-MCNC: 0.04 NG/ML — CRITICAL HIGH
WBC # BLD: 8.19 K/UL — SIGNIFICANT CHANGE UP (ref 4.8–10.8)
WBC # FLD AUTO: 8.19 K/UL — SIGNIFICANT CHANGE UP (ref 4.8–10.8)

## 2019-07-08 PROCEDURE — 76705 ECHO EXAM OF ABDOMEN: CPT | Mod: 26

## 2019-07-08 PROCEDURE — 93971 EXTREMITY STUDY: CPT | Mod: 26

## 2019-07-08 PROCEDURE — 99223 1ST HOSP IP/OBS HIGH 75: CPT | Mod: AI

## 2019-07-08 RX ORDER — CHLORHEXIDINE GLUCONATE 213 G/1000ML
1 SOLUTION TOPICAL
Refills: 0 | Status: DISCONTINUED | OUTPATIENT
Start: 2019-07-08 | End: 2019-07-15

## 2019-07-08 RX ORDER — ENOXAPARIN SODIUM 100 MG/ML
40 INJECTION SUBCUTANEOUS DAILY
Refills: 0 | Status: DISCONTINUED | OUTPATIENT
Start: 2019-07-08 | End: 2019-07-08

## 2019-07-08 RX ORDER — RISPERIDONE 4 MG/1
4 TABLET ORAL
Refills: 0 | Status: DISCONTINUED | OUTPATIENT
Start: 2019-07-08 | End: 2019-07-15

## 2019-07-08 RX ORDER — PIPERACILLIN AND TAZOBACTAM 4; .5 G/20ML; G/20ML
3.38 INJECTION, POWDER, LYOPHILIZED, FOR SOLUTION INTRAVENOUS EVERY 8 HOURS
Refills: 0 | Status: DISCONTINUED | OUTPATIENT
Start: 2019-07-08 | End: 2019-07-09

## 2019-07-08 RX ORDER — PIPERACILLIN AND TAZOBACTAM 4; .5 G/20ML; G/20ML
3.38 INJECTION, POWDER, LYOPHILIZED, FOR SOLUTION INTRAVENOUS ONCE
Refills: 0 | Status: COMPLETED | OUTPATIENT
Start: 2019-07-08 | End: 2019-07-08

## 2019-07-08 RX ORDER — HEPARIN SODIUM 5000 [USP'U]/ML
5000 INJECTION INTRAVENOUS; SUBCUTANEOUS EVERY 8 HOURS
Refills: 0 | Status: DISCONTINUED | OUTPATIENT
Start: 2019-07-08 | End: 2019-07-15

## 2019-07-08 RX ORDER — NYSTATIN CREAM 100000 [USP'U]/G
2 CREAM TOPICAL ONCE
Refills: 0 | Status: COMPLETED | OUTPATIENT
Start: 2019-07-08 | End: 2019-07-09

## 2019-07-08 RX ORDER — NIFEDIPINE 30 MG
30 TABLET, EXTENDED RELEASE 24 HR ORAL DAILY
Refills: 0 | Status: DISCONTINUED | OUTPATIENT
Start: 2019-07-08 | End: 2019-07-15

## 2019-07-08 RX ADMIN — Medication 30 MILLIGRAM(S): at 05:39

## 2019-07-08 RX ADMIN — PIPERACILLIN AND TAZOBACTAM 25 GRAM(S): 4; .5 INJECTION, POWDER, LYOPHILIZED, FOR SOLUTION INTRAVENOUS at 15:28

## 2019-07-08 RX ADMIN — RISPERIDONE 4 MILLIGRAM(S): 4 TABLET ORAL at 17:42

## 2019-07-08 RX ADMIN — PIPERACILLIN AND TAZOBACTAM 25 GRAM(S): 4; .5 INJECTION, POWDER, LYOPHILIZED, FOR SOLUTION INTRAVENOUS at 23:42

## 2019-07-08 RX ADMIN — HEPARIN SODIUM 5000 UNIT(S): 5000 INJECTION INTRAVENOUS; SUBCUTANEOUS at 15:28

## 2019-07-08 RX ADMIN — RISPERIDONE 4 MILLIGRAM(S): 4 TABLET ORAL at 05:39

## 2019-07-08 RX ADMIN — HEPARIN SODIUM 5000 UNIT(S): 5000 INJECTION INTRAVENOUS; SUBCUTANEOUS at 05:39

## 2019-07-08 RX ADMIN — HEPARIN SODIUM 5000 UNIT(S): 5000 INJECTION INTRAVENOUS; SUBCUTANEOUS at 23:43

## 2019-07-08 RX ADMIN — PIPERACILLIN AND TAZOBACTAM 200 GRAM(S): 4; .5 INJECTION, POWDER, LYOPHILIZED, FOR SOLUTION INTRAVENOUS at 11:54

## 2019-07-08 NOTE — H&P ADULT - HISTORY OF PRESENT ILLNESS
History is provided by daughter: Zoë: [phone]    79y/o w/ past medical history of hypertension (not on any meds), COPD (not on any meds), and schizophrenia presenting w/ chief complaint of decreased PO intake and inability to ambulate.    As per daughter, pt is minimally-verbal, but AAOx3 at baseline. Over the last week, she has been having generalized weakness, and  significantly reduced PO intake which is unusual for her. The pt's mental status has slightly worsened, and she is not responding to her daughter's questions, often "saying no to everything". She has also not been able to ambulate from her bed to the bathroom, and had a few episodes of urinary incontinence. Pt lives with her daughter at home, and usually ambulates with a walker. She denies any fevers, chills, chest pain, palpitations, shortness of breath, abdominal pain, dysuria, frequency or urgency at home.   Daughter says the pt has not followed up with a PMD in a few years as her PMD is not "out of network" and she has not been able to find a PMD in [place] yet.    In the ED, VS: /74  RR 20 T 99.1 satting 96% on RA. She received Rocephin 1g, 2L NS 0.9%.   She had a CTH that was negative for acute intracranial pathology, and a CT A/P that showed Anterior abdominal wall hernia containing a loop of non-obstructed bowel.

## 2019-07-08 NOTE — H&P ADULT - ASSESSMENT
79y/o w/ past medical history of hypertension (not on any meds), COPD (not on any meds), and schizophrenia presenting w/ chief complaint of decreased PO intake and inability to ambulate found to have ROSSY and mild troponemia    #Acute kidney injury  - likely pre-renal secondary to dehydration as pt is unable to care for herself and daughter works during the day  - Cr 1.6 on admission (baseline normal 1.1 in 2018)  - Gentle hydration with NS 0.9% at 75 cc/hr for 24hrs  -Trend BMP and send urine studies if no improvement  -No evidence of hydronephrosis on CT A/P    #Transaminits  - AST/ALT: 107/133.  Bilirubin 1.4. Mixed hepatocellular/cholestatic pattern  -No evidence of acute liver failure  - Differential Dx includes choledocholithiasis vs Hepatitis vs autoimmune process vs NFLD  - CT A/P w/ contrast negative  - US RUQ ordered  - Hep panel, JORDI, Anti-SM Ab, Ferritin, Ceruloplasmin, and TSH levels ordered    #Mild Troponemia  - Trops 0.01 -> 0.03. F/u repeat at 11AM  - Pt denies chest pain, sob, palpitations, nausea or vomiting. EKG negative for ischemic changes    #Failure to thrive  - Decreased PO intake, decreased ambulation, dehydration  - Calorie count and dietary eval requested  - Dash diet with 1:1 PO feeds  - PT/Rehab eval requested  - Daughter will prefer pt to go back home on discharge, not SNF. She mentioned she will be coming in tmrw morning to further discuss the matter.    #Abdominal wall hernia with loop of non-obstructed large bowel  -Incidental finding on CT scan  - Pt seen by surgery, f/u w/ Matt Viveros as outpt     Schizophrenia  - C/w Risperidone 4mg q12hr    #Leukocytosis  - likely reactive; no signs of acute inftious at this time, pt afebrile  - CXR negative for acute cardiopulmonary disease. U/A -ve  - Trend CBCs    Diet: DASH  DVT PPX: Heparin SQ  Activity: out of bed to chair     #Dispo: Medical floor    #Code Status: Full 77y/o w/ past medical history of hypertension (not on any meds), COPD (not on any meds), and schizophrenia presenting w/ chief complaint of decreased PO intake and inability to ambulate found to have ROSSY and mild troponemia.     #Acute kidney injury  - likely pre-renal secondary to dehydration as pt is unable to care for herself and daughter works during the day  - Cr 1.6 on admission (baseline normal 1.1 in 2018)  - Gentle hydration with NS 0.9% at 75 cc/hr for 24hrs  -Trend BMP and send urine studies if no improvement  -No evidence of hydronephrosis on CT A/P    #Transaminitis  - AST/ALT: 107/133.  Bilirubin 1.4. Mixed hepatocellular/cholestatic pattern  -No evidence of acute liver failure  - Differential Dx includes choledocholithiasis vs Hepatitis vs autoimmune process vs NFLD  - CT A/P w/ contrast negative  - US RUQ ordered  - Hep panel, JORDI, Anti-SM Ab, Ferritin, Ceruloplasmin, and TSH levels ordered    #Mild Troponemia  - Trops 0.01 -> 0.03. F/u repeat at 11AM  - Pt denies chest pain, sob, palpitations, nausea or vomiting. EKG negative for ischemic changes    #Failure to thrive  - Decreased PO intake, decreased ambulation, dehydration  - Calorie count and dietary eval requested  - Dash diet with 1:1 PO feeds  - PT/Rehab eval requested  - Daughter will prefer pt to go back home on discharge, not SNF. She mentioned she will be coming in tmrw morning to further discuss the matter.    #Abdominal wall hernia with loop of non-obstructed large bowel  -Incidental finding on CT scan  - Pt seen by surgery, f/u w/ Matt Viveros as outpt     Schizophrenia  - C/w Risperidone 4mg q12hr    #Leukocytosis  - CXR negative for acute cardiopulmonary disease. U/A -ve  - Trend CBCs    Diet: DASH  DVT PPX: Heparin SQ  Activity: out of bed to chair     #Dispo: Medical floor    #Code Status: Full

## 2019-07-08 NOTE — H&P ADULT - NSICDXPASTMEDICALHX_GEN_ALL_CORE_FT
PAST MEDICAL HISTORY:  COPD (chronic obstructive pulmonary disease)     Hypertension     Schizo affective schizophrenia

## 2019-07-08 NOTE — DIETITIAN INITIAL EVALUATION ADULT. - PHYSICAL APPEARANCE
obese/alert and oriented. BMI: 45.5 (stated ht of 62"/249#), IBW: 110#, unsure of UBW, denies any recent wt loss. no edema noted, stage 2 pressure injury to sacrum.

## 2019-07-08 NOTE — CONSULT NOTE ADULT - SUBJECTIVE AND OBJECTIVE BOX
KARRIE CARLISLE [id number]  78y Female    HPI:  78F w/PMHx of HTN, COPD, and schizophrenia presents to the ED with weakness. LFT abnormalities were noted on LFTs, CT abdomen     PAST MEDICAL & SURGICAL HISTORY:  COPD (chronic obstructive pulmonary disease)  Hypertension  Schizo affective schizophrenia  H/O total hysterectomy        MEDICATIONS  (STANDING):    risperiDONE 4 mg oral tablet: 1 tab(s) orally 2 times a day (2018 13:14)    Allergies    No Known Allergies    Intolerances    REVIEW OF SYSTEMS    [ ] A ten-point review of systems was otherwise negative except as noted.    Vital Signs Last 24 Hrs  T(C): 36.6 (2019 15:57), Max: 38 (2019 14:25)  T(F): 97.9 (2019 15:57), Max: 100.4 (2019 14:25)  HR: 97 (2019 15:57) (97 - 110)  BP: 201/96 (2019 16:28) (164/74 - 215/104)  BP(mean): --  RR: 16 (2019 15:57) (16 - 20)  SpO2: 97% (2019 15:57) (96% - 97%)    PHYSICAL EXAM:  GENERAL: NAD, well-appearing  CHEST/LUNG: Clear to auscultation bilaterally  HEART: Regular rate and rhythm  ABDOMEN: Soft, Nontender, Nondistended;   EXTREMITIES:  No clubbing, cyanosis, or edema      LABS:  Labs:  CAPILLARY BLOOD GLUCOSE                              14.5   12.06 )-----------( 219      ( 2019 18:22 )             44.5       Auto Neutrophil %: 89.9 % (19 @ 18:22)  Auto Immature Granulocyte %: 0.7 % (19 @ 18:22)        139  |  98  |  22<H>  ----------------------------<  93  3.5   |  24  |  1.6<H>      Calcium, Total Serum: 9.3 mg/dL (19 @ 18:22)      LFTs:             7.3  | 1.4  | 107      ------------------[136     ( 2019 18:22 )  3.7  | x    | 133         Lipase:x      Amylase:x         Blood Gas Venous - Lactate: 1.6 mmoL/L (19 @ 14:31)      Coags:     15.00  ----< 1.31    ( 2019 14:29 )     28.7        CARDIAC MARKERS ( 2019 18:22 )  x     / 0.03 ng/mL / x     / x     / x      CARDIAC MARKERS ( 2019 14:20 )  x     / <0.01 ng/mL / x     / x     / x              Urinalysis Basic - ( 2019 14:20 )    Color: Orange / Appearance: Cloudy / S.020 / pH: x  Gluc: x / Ketone: Trace  / Bili: Moderate / Urobili: 1.0 mg/dL   Blood: x / Protein: 30 mg/dL / Nitrite: Negative   Leuk Esterase: Small / RBC: x / WBC 3-5 /HPF   Sq Epi: x / Non Sq Epi: x / Bacteria: Few /HPF            RADIOLOGY & ADDITIONAL STUDIES: KARRIE CARLISLE [id number]  78y Female    HPI:  78F w/PMHx of HTN, COPD, and schizophrenia presents to the ED with weakness. LFT abnormalities were noted on LFTs, CT abdomen performed and showed anterior abdominal wall hernia with loop of non-obstructed large bowel. Patient is a poor historian, does not report having any abdominal pain now or prior to coming to the ED.    PAST MEDICAL & SURGICAL HISTORY:  COPD (chronic obstructive pulmonary disease)  Hypertension  Schizo affective schizophrenia  H/O total hysterectomy    MEDICATIONS  (STANDING):    risperiDONE 4 mg oral tablet: 1 tab(s) orally 2 times a day (2018 13:14)    Allergies    No Known Allergies    Intolerances    REVIEW OF SYSTEMS    [ ] A ten-point review of systems was otherwise negative except as noted.    Vital Signs Last 24 Hrs  T(C): 36.6 (2019 15:57), Max: 38 (2019 14:25)  T(F): 97.9 (2019 15:57), Max: 100.4 (2019 14:25)  HR: 97 (2019 15:57) (97 - 110)  BP: 201/96 (2019 16:28) (164/74 - 215/104)  BP(mean): --  RR: 16 (2019 15:57) (16 - 20)  SpO2: 97% (2019 15:57) (96% - 97%)    PHYSICAL EXAM:  GENERAL: NAD, well-appearing  CHEST/LUNG: Clear to auscultation bilaterally  HEART: Regular rate and rhythm  ABDOMEN: Non-tender, abdomen soft and obese   EXTREMITIES:  No clubbing, cyanosis, or edema      LABS:  Labs:  CAPILLARY BLOOD GLUCOSE                              14.5   12.06 )-----------( 219      ( 2019 18:22 )             44.5       Auto Neutrophil %: 89.9 % (19 @ 18:22)  Auto Immature Granulocyte %: 0.7 % (19 @ 18:22)        139  |  98  |  22<H>  ----------------------------<  93  3.5   |  24  |  1.6<H>      Calcium, Total Serum: 9.3 mg/dL (19 @ 18:22)      LFTs:             7.3  | 1.4  | 107      ------------------[136     ( 2019 18:22 )  3.7  | x    | 133         Lipase:x      Amylase:x         Blood Gas Venous - Lactate: 1.6 mmoL/L (19 @ 14:31)      Coags:     15.00  ----< 1.31    ( 2019 14:29 )     28.7        CARDIAC MARKERS ( 2019 18:22 )  x     / 0.03 ng/mL / x     / x     / x      CARDIAC MARKERS ( 2019 14:20 )  x     / <0.01 ng/mL / x     / x     / x        Urinalysis Basic - ( 2019 14:20 )    Color: Orange / Appearance: Cloudy / S.020 / pH: x  Gluc: x / Ketone: Trace  / Bili: Moderate / Urobili: 1.0 mg/dL   Blood: x / Protein: 30 mg/dL / Nitrite: Negative   Leuk Esterase: Small / RBC: x / WBC 3-5 /HPF   Sq Epi: x / Non Sq Epi: x / Bacteria: Few /HPF            RADIOLOGY & ADDITIONAL STUDIES:

## 2019-07-08 NOTE — DIETITIAN INITIAL EVALUATION ADULT. - OTHER INFO
Nutrition Hx PTA: Pt minimally verbal, however she states that she eats 1 meal daily and denies use of oral nutrition supplements. Says she's tried Ensure in the past, but no comment on whether she likes it or not. NKFA.     Pt p/w decreased PO intake and inability to ambulate found to have ROSSY and mild troponemia. ROSSY likely pre-renal secondary to dehydration as pt is unable to care for herself and daughter works during the day. Gentle hydration with NS 0.9% at 75 cc/hr for 24hrs. Trend BMP and send urine studies if no improvement. Calorie count ordered and hung by RD in ED on 7/8; results due 7/11.

## 2019-07-08 NOTE — CONSULT NOTE ADULT - SUBJECTIVE AND OBJECTIVE BOX
HPI:  History is provided by daughter: Zoë: [phone]    79y/o w/ past medical history of hypertension (not on any meds), COPD (not on any meds), and schizophrenia presenting w/ chief complaint of decreased PO intake and inability to ambulate.    As per daughter, pt is minimally-verbal, but AAOx3 at baseline. Over the last week, she has been having generalized weakness, and  significantly reduced PO intake which is unusual for her. The pt's mental status has slightly worsened, and she is not responding to her daughter's questions, often "saying no to everything". She has also not been able to ambulate from her bed to the bathroom, and had a few episodes of urinary incontinence. Pt lives with her daughter at home, and usually ambulates with a walker. She denies any fevers, chills, chest pain, palpitations, shortness of breath, abdominal pain, dysuria, frequency or urgency at home.   Daughter says the pt has not followed up with a PMD in a few years as her PMD is not "out of network" and she has not been able to find a PMD in [place] yet.    In the ED, VS: /74  RR 20 T 99.1 satting 96% on RA. She received Rocephin 1g, 2L NS 0.9%.   She had a CTH that was negative for acute intracranial pathology, and a CT A/P that showed Anterior abdominal wall hernia containing a loop of non-obstructed bowel. (2019 04:00)      PAST MEDICAL & SURGICAL HISTORY:  COPD (chronic obstructive pulmonary disease)  Hypertension  Schizo affective schizophrenia  H/O total hysterectomy      Hospital Course:    TODAY'S SUBJECTIVE & REVIEW OF SYMPTOMS:     Constitutional WNL   Cardio WNL   Resp WNL   GI WNL  Heme WNL  Endo WNL  Skin WNL  MSK Weakness  Neuro WNL  Cognitive WNL  Psych WNL      MEDICATIONS  (STANDING):  chlorhexidine 4% Liquid 1 Application(s) Topical <User Schedule>  heparin  Injectable 5000 Unit(s) SubCutaneous every 8 hours  NIFEdipine XL 30 milliGRAM(s) Oral daily  piperacillin/tazobactam IVPB.. 3.375 Gram(s) IV Intermittent every 8 hours  risperiDONE   Tablet 4 milliGRAM(s) Oral two times a day    MEDICATIONS  (PRN):      FAMILY HISTORY:  FH: hypertension      Allergies    No Known Allergies    Intolerances        SOCIAL HISTORY:    [  ] Etoh  [  ] Smoking  [  ] Substance abuse     Home Environment:  [  ] Home Alone  [ x ] Lives with Family  [  ] Home Health Aid    Dwelling:  [  ] Apartment  [ x ] Private House  [  ] Adult Home  [  ] Skilled Nursing Facility      [  ] Short Term  [  ] Long Term  [x  ] Stairs       Elevator [  ]    FUNCTIONAL STATUS PTA: (Check all that apply)  Ambulation: [   ]Independent    [  ] Dependent     [  ] Non-Ambulatory  Assistive Device: [  ] SA Cane  [  ]  Q Cane  [x  ] Walker  [  ]  Wheelchair  ADL : [  ] Independent  [x  ]  Dependent       Vital Signs Last 24 Hrs  T(C): 36.4 (2019 04:24), Max: 38 (2019 14:25)  T(F): 97.5 (2019 04:24), Max: 100.4 (2019 14:25)  HR: 102 (2019 04:24) (97 - 102)  BP: 176/93 (2019 04:24) (176/93 - 215/104)  BP(mean): --  RR: 16 (2019 04:24) (16 - 16)  SpO2: 97% (2019 04:24) (97% - 97%)      PHYSICAL EXAM: Alert & awake  GENERAL: NAD, well-groomed, well-developed  HEAD:  Atraumatic, Normocephalic  CHEST/LUNG: Clear   HEART: S1S2+  ABDOMEN: Soft, Nontender  EXTREMITIES:  no calf tenderness    NERVOUS SYSTEM:  Cranial Nerves 2-12 intact [  ] Abnormal  [  ]  ROM: WFL all extremities [  ]  Abnormal [  ]able to move all ext  Motor Strength: WFL all extremities  [  ]  Abnormal [  ]  Sensation: intact to light touch [  ] Abnormal [  ]  Reflexes: Symmetric [  ]  Abnormal [  ]    FUNCTIONAL STATUS:  Bed Mobility: Independent [  ]  Supervision [  ]  Needs Assistance [x  ]  N/A [  ]  Transfers: Independent [  ]  Supervision [  ]  Needs Assistance [x  ]  N/A [  ]   Ambulation: Independent [  ]  Supervision [  ]  Needs Assistance [  ]  N/A [  ]  ADL: Independent [  ] Requires Assistance [  ] N/A [  ]      LABS:                        14.8   8.19  )-----------( 188      ( 2019 12:09 )             45.3     07-07    139  |  98  |  22<H>  ----------------------------<  93  3.5   |  24  |  1.6<H>    Ca    9.3      2019 18:22    TPro  7.3  /  Alb  3.7  /  TBili  1.4<H>  /  DBili  x   /  AST  107<H>  /  ALT  133<H>  /  AlkPhos  136<H>  07    PT/INR - ( 2019 12:09 )   PT: 13.60 sec;   INR: 1.18 ratio         PTT - ( 2019 12:09 )  PTT:31.3 sec  Urinalysis Basic - ( 2019 14:20 )    Color: Orange / Appearance: Cloudy / S.020 / pH: x  Gluc: x / Ketone: Trace  / Bili: Moderate / Urobili: 1.0 mg/dL   Blood: x / Protein: 30 mg/dL / Nitrite: Negative   Leuk Esterase: Small / RBC: x / WBC 3-5 /HPF   Sq Epi: x / Non Sq Epi: x / Bacteria: Few /HPF        RADIOLOGY & ADDITIONAL STUDIES:    Assesment:

## 2019-07-08 NOTE — H&P ADULT - NSHPLABSRESULTS_GEN_ALL_CORE
14.5   12.06 )-----------( 219      ( 07 Jul 2019 18:22 )             44.5     07-07    139  |  98  |  22<H>  ----------------------------<  93  3.5   |  24  |  1.6<H>    Ca    9.3      07 Jul 2019 18:22    TPro  7.3  /  Alb  3.7  /  TBili  1.4<H>  /  DBili  x   /  AST  107<H>  /  ALT  133<H>  /  AlkPhos  136<H>  07-07      CARDIAC MARKERS ( 07 Jul 2019 18:22 )  x     / 0.03 ng/mL / x     / x     / x      CARDIAC MARKERS ( 07 Jul 2019 14:20 )  x     / <0.01 ng/mL / x     / x     / x        EKG: NSR. No ischemic changes        Radiology:     < from: CT Head No Cont (09.22.18 @ 11:55) >    IMPRESSION:    No CT evidence of acute intracranial pathology. Stable exam since   10/22/2014.     < end of copied text >      IMPRESSION:     Contrast is seen within the bilateral collecting system, limiting the   evaluation of renal stones.    Anterior abdominal wall hernia containing a loop of nonobstructed bowel.    Additional findings after attending review: The right colon appears   thickened despite underdistention. Correlate for colitis.      < end of copied text >      < from: Xray Chest 1 View AP/PA (07.07.19 @ 14:49) >    Impression:      No radiographic evidence of acute pulmonary disease.    < end of copied text >

## 2019-07-08 NOTE — CONSULT NOTE ADULT - ATTENDING COMMENTS
nonobstructing incisional hernia.  outpt fu   Assessment and plan above were modified and discussed with residents, physician assistants, and nurses.

## 2019-07-08 NOTE — DIETITIAN INITIAL EVALUATION ADULT. - ENERGY NEEDS
Calories: 6496-3283 kcal/day (25-30 kcal/kg IBW)--morbid obesity considered  Protein: 50-60 gm/day (1-1.2 gm/kg IBW)  Fluid: 1 ml/kcal

## 2019-07-08 NOTE — DIETITIAN INITIAL EVALUATION ADULT. - NAME AND PHONE
RD to monitor diet order, energy intake, renal/liver profile, nutrition focused physical findings, body composition

## 2019-07-08 NOTE — H&P ADULT - NSHPPHYSICALEXAM_GEN_ALL_CORE
Vital Signs Last 24 Hrs  T(C): 36.4 (08 Jul 2019 04:24), Max: 38 (07 Jul 2019 14:25)  T(F): 97.5 (08 Jul 2019 04:24), Max: 100.4 (07 Jul 2019 14:25)  HR: 102 (08 Jul 2019 04:24) (97 - 110)  BP: 176/93 (08 Jul 2019 04:24) (164/74 - 215/104)  BP(mean): --  RR: 16 (08 Jul 2019 04:24) (16 - 20)  SpO2: 97% (08 Jul 2019 04:24) (96% - 97%)    PHYSICAL EXAM:    General: Minimally conversant, slightly dishevelled. AAOx3  HEENT: Atraumatic, normocephalic. PERRLA.  Cardio: Regular rate and rhythm. S1, S2 appreciated. no murmurs.  Pulm: Bilateral breath sounds. No wheezing, or rhonchi  Abdomen: Soft, non-tender, distended. Positive bowel sounds  Extremities: RUE edema, no tenderness.  Neuro: No focal deficits. Motor 5/5 throughout. Sensation intact

## 2019-07-08 NOTE — H&P ADULT - ATTENDING COMMENTS
Agree with resident's note above. Exceptions include:  #Sepsis POA (leukocytosis, tachycardia) 2/2 GNR Bacteremia   - start zosyn   - repeat blood cultures   - obtain RUQ sonogram   - ID consult

## 2019-07-09 LAB
ALBUMIN SERPL ELPH-MCNC: 3.2 G/DL — LOW (ref 3.5–5.2)
ALP SERPL-CCNC: 94 U/L — SIGNIFICANT CHANGE UP (ref 30–115)
ALT FLD-CCNC: 58 U/L — HIGH (ref 0–41)
ANION GAP SERPL CALC-SCNC: 13 MMOL/L — SIGNIFICANT CHANGE UP (ref 7–14)
AST SERPL-CCNC: 33 U/L — SIGNIFICANT CHANGE UP (ref 0–41)
BASOPHILS # BLD AUTO: 0.04 K/UL — SIGNIFICANT CHANGE UP (ref 0–0.2)
BASOPHILS NFR BLD AUTO: 0.6 % — SIGNIFICANT CHANGE UP (ref 0–1)
BILIRUB SERPL-MCNC: 0.7 MG/DL — SIGNIFICANT CHANGE UP (ref 0.2–1.2)
BUN SERPL-MCNC: 19 MG/DL — SIGNIFICANT CHANGE UP (ref 10–20)
CALCIUM SERPL-MCNC: 8.7 MG/DL — SIGNIFICANT CHANGE UP (ref 8.5–10.1)
CERULOPLASMIN SERPL-MCNC: 32 MG/DL — SIGNIFICANT CHANGE UP (ref 16–45)
CHLORIDE SERPL-SCNC: 102 MMOL/L — SIGNIFICANT CHANGE UP (ref 98–110)
CK MB CFR SERPL CALC: 3.7 NG/ML — SIGNIFICANT CHANGE UP (ref 0.6–6.3)
CO2 SERPL-SCNC: 23 MMOL/L — SIGNIFICANT CHANGE UP (ref 17–32)
CREAT SERPL-MCNC: 1.1 MG/DL — SIGNIFICANT CHANGE UP (ref 0.7–1.5)
EOSINOPHIL # BLD AUTO: 0.09 K/UL — SIGNIFICANT CHANGE UP (ref 0–0.7)
EOSINOPHIL NFR BLD AUTO: 1.4 % — SIGNIFICANT CHANGE UP (ref 0–8)
GLUCOSE SERPL-MCNC: 118 MG/DL — HIGH (ref 70–99)
GRAM STN FLD: SIGNIFICANT CHANGE UP
HAV IGM SER-ACNC: SIGNIFICANT CHANGE UP
HBV CORE IGM SER-ACNC: SIGNIFICANT CHANGE UP
HBV SURFACE AB SER-ACNC: SIGNIFICANT CHANGE UP
HBV SURFACE AG SER-ACNC: SIGNIFICANT CHANGE UP
HCT VFR BLD CALC: 41.2 % — SIGNIFICANT CHANGE UP (ref 37–47)
HCV AB S/CO SERPL IA: 0.12 S/CO — SIGNIFICANT CHANGE UP (ref 0–0.99)
HCV AB SERPL-IMP: SIGNIFICANT CHANGE UP
HGB BLD-MCNC: 13.6 G/DL — SIGNIFICANT CHANGE UP (ref 12–16)
IMM GRANULOCYTES NFR BLD AUTO: 0.8 % — HIGH (ref 0.1–0.3)
LYMPHOCYTES # BLD AUTO: 0.4 K/UL — LOW (ref 1.2–3.4)
LYMPHOCYTES # BLD AUTO: 6.3 % — LOW (ref 20.5–51.1)
MCHC RBC-ENTMCNC: 30.4 PG — SIGNIFICANT CHANGE UP (ref 27–31)
MCHC RBC-ENTMCNC: 33 G/DL — SIGNIFICANT CHANGE UP (ref 32–37)
MCV RBC AUTO: 92 FL — SIGNIFICANT CHANGE UP (ref 81–99)
MONOCYTES # BLD AUTO: 0.35 K/UL — SIGNIFICANT CHANGE UP (ref 0.1–0.6)
MONOCYTES NFR BLD AUTO: 5.5 % — SIGNIFICANT CHANGE UP (ref 1.7–9.3)
NEUTROPHILS # BLD AUTO: 5.47 K/UL — SIGNIFICANT CHANGE UP (ref 1.4–6.5)
NEUTROPHILS NFR BLD AUTO: 85.4 % — HIGH (ref 42.2–75.2)
NRBC # BLD: 0 /100 WBCS — SIGNIFICANT CHANGE UP (ref 0–0)
PLATELET # BLD AUTO: 165 K/UL — SIGNIFICANT CHANGE UP (ref 130–400)
POTASSIUM SERPL-MCNC: 3.4 MMOL/L — LOW (ref 3.5–5)
POTASSIUM SERPL-SCNC: 3.4 MMOL/L — LOW (ref 3.5–5)
PROT SERPL-MCNC: 6.5 G/DL — SIGNIFICANT CHANGE UP (ref 6–8)
RBC # BLD: 4.48 M/UL — SIGNIFICANT CHANGE UP (ref 4.2–5.4)
RBC # FLD: 14.9 % — HIGH (ref 11.5–14.5)
SODIUM SERPL-SCNC: 138 MMOL/L — SIGNIFICANT CHANGE UP (ref 135–146)
TROPONIN T SERPL-MCNC: 0.07 NG/ML — CRITICAL HIGH
TSH SERPL-MCNC: 7.41 UIU/ML — HIGH (ref 0.27–4.2)
WBC # BLD: 6.4 K/UL — SIGNIFICANT CHANGE UP (ref 4.8–10.8)
WBC # FLD AUTO: 6.4 K/UL — SIGNIFICANT CHANGE UP (ref 4.8–10.8)

## 2019-07-09 PROCEDURE — 93306 TTE W/DOPPLER COMPLETE: CPT | Mod: 26

## 2019-07-09 PROCEDURE — 99233 SBSQ HOSP IP/OBS HIGH 50: CPT

## 2019-07-09 RX ORDER — MEROPENEM 1 G/30ML
2000 INJECTION INTRAVENOUS EVERY 8 HOURS
Refills: 0 | Status: DISCONTINUED | OUTPATIENT
Start: 2019-07-09 | End: 2019-07-09

## 2019-07-09 RX ORDER — MEROPENEM 1 G/30ML
1000 INJECTION INTRAVENOUS EVERY 8 HOURS
Refills: 0 | Status: DISCONTINUED | OUTPATIENT
Start: 2019-07-09 | End: 2019-07-09

## 2019-07-09 RX ORDER — PIPERACILLIN AND TAZOBACTAM 4; .5 G/20ML; G/20ML
3.38 INJECTION, POWDER, LYOPHILIZED, FOR SOLUTION INTRAVENOUS EVERY 8 HOURS
Refills: 0 | Status: DISCONTINUED | OUTPATIENT
Start: 2019-07-09 | End: 2019-07-10

## 2019-07-09 RX ADMIN — PIPERACILLIN AND TAZOBACTAM 25 GRAM(S): 4; .5 INJECTION, POWDER, LYOPHILIZED, FOR SOLUTION INTRAVENOUS at 06:39

## 2019-07-09 RX ADMIN — HEPARIN SODIUM 5000 UNIT(S): 5000 INJECTION INTRAVENOUS; SUBCUTANEOUS at 06:48

## 2019-07-09 RX ADMIN — PIPERACILLIN AND TAZOBACTAM 25 GRAM(S): 4; .5 INJECTION, POWDER, LYOPHILIZED, FOR SOLUTION INTRAVENOUS at 21:43

## 2019-07-09 RX ADMIN — CHLORHEXIDINE GLUCONATE 1 APPLICATION(S): 213 SOLUTION TOPICAL at 06:47

## 2019-07-09 RX ADMIN — MEROPENEM 100 MILLIGRAM(S): 1 INJECTION INTRAVENOUS at 13:00

## 2019-07-09 RX ADMIN — HEPARIN SODIUM 5000 UNIT(S): 5000 INJECTION INTRAVENOUS; SUBCUTANEOUS at 21:43

## 2019-07-09 RX ADMIN — RISPERIDONE 4 MILLIGRAM(S): 4 TABLET ORAL at 18:41

## 2019-07-09 RX ADMIN — HEPARIN SODIUM 5000 UNIT(S): 5000 INJECTION INTRAVENOUS; SUBCUTANEOUS at 13:00

## 2019-07-09 RX ADMIN — NYSTATIN CREAM 2 APPLICATION(S): 100000 CREAM TOPICAL at 06:41

## 2019-07-09 RX ADMIN — Medication 30 MILLIGRAM(S): at 06:57

## 2019-07-09 RX ADMIN — RISPERIDONE 4 MILLIGRAM(S): 4 TABLET ORAL at 06:56

## 2019-07-09 NOTE — CONSULT NOTE ADULT - ASSESSMENT
IMPRESSION: Rehab of gait dysfunction      PRECAUTIONS: [  ] Cardiac  [  ] Respiratory  [  ] Seizures [  ] Contact Isolation  [  ] Droplet Isolation  [  ] Other    Weight Bearing Status:     RECOMMENDATION:    Out of Bed to Chair     DVT/Decubiti Prophylaxis    REHAB PLAN:     [  x ] Bedside P/T 3-5 times a week   [   ]   Bedside O/T  2-3 times a week             [   ] No Rehab Therapy Indicated                   [   ]  Speech Therapy   Conditioning/ROM                                    ADL  Bed Mobility                                               Conditioning/ROM  Transfers                                                     Bed Mobility  Sitting /Standing Balance                         Transfers                                        Gait Training                                               Sitting/Standing Balance  Stair Training [   ]Applicable                    Home equipment Eval                                                                        Splinting  [   ] Only      GOALS:   ADL   [   ]   Independent                    Transfers  [x   ] Independent                          Ambulation  [ x  ] Independent     [   x ] With device                            [   ]  CG                                                         [   ]  CG                                                                  [   ] CG                            [    ] Min A                                                   [   ] Min A                                                              [   ] Min  A          DISCHARGE PLAN:   [   ]  Good candidate for Intensive Rehabilitation/Hospital based                                             Will tolerate 3hrs Intensive Rehab Daily                                       [ x   ]  Short Term Rehab in Skilled Nursing Facility                     vs                  [ x   ]  Home with Outpatient or VN services                                         [    ]  Possible Candidate for Intensive Hospital based Rehab
78F w/PMHx of HTN, COPD, and schizophrenia presents to the ED with weakness w/anterior abdominal wall hernia with loop of non-obstructed large bowel found incidentally on CT scan.     Plan:   -No surgical intervention needed at this time  -F/u with Dr. Maynard as outpatient
ASSESSMENT  77y/o w/ past medical history of hypertension, COPD, schizophrenia presenting w/ chief complaint of decreased PO intake and inability to ambulate found to have Ecoli BSI    PROBLEMS  #Ecoli bacteremia (+7/7), no sepsis on admission    Unclear etiology possible GI translocation as CT with possible colitis. UCX NG    CT AP with Anterior abdominal wall hernia containing a loop of nonobstructed bowel. Additional findings after attending review: The right colon appears thickened despite underdistention. Correlate for colitis.    US with GB sludge    CXR no PNA  Transaminitis downtrending   #Obesity BMI (kg/m2): 35.6 (07-08-19 @ 22:45)    RECOMMENDATIONS  - Continue zosyn IV while pending Ecoli sensitivities   - Likely plan for D/C on PO cipro/flagyl if sensitive    Spectra [phone]

## 2019-07-09 NOTE — PHYSICAL THERAPY INITIAL EVALUATION ADULT - LEVEL OF INDEPENDENCE: STAND/SIT, REHAB EVAL
pt unsafe when sitting down. required max verbal cues for safety and physical assistance/minimum assist (75% patients effort)

## 2019-07-09 NOTE — PROGRESS NOTE ADULT - SUBJECTIVE AND OBJECTIVE BOX
Your Child's Health  Nine-Month-Old Visit    Kendell Carranza Jr.  April 4, 2019             Visit Vitals  Ht 28\" (71.1 cm)   Wt 8.52 kg   HC 44.9 cm (17.68\")   BMI 16.84 kg/m²     Weight: 18.78 lbs    NUTRITION: Phase out the bottle.  Children should get off the bottle as soon as possible after reaching one year of age.  Bottle use past one year contributes to higher rates of tooth decay.  If you have a hard time stopping the bottle, you can either fill the bottle with water (which will not harm the teeth) or remove the bottle as soon as it is empty.  Encourage the use of a cup.  Introduce finger foods:  Table food can comprise a full diet as long as it is of a type that the baby can manage without choking.  For that reason, avoid nuts, popcorn, raw vegetables, and foods like grapes and large hot dog pieces that, if swallowed whole, could block the airway.    Because we have less sunlight in our part of the [place], infants whose only source of milk is mother's milk should have Vitamin D supplements (available without prescription at the drug store) each day.        We no longer consider juice a health food.  When fruits were not available, it was a nutritional help, but whole fruits are much better nutritionally than juice. Amounts of juice over 4 oz per day are associated with an increased risk of obesity.    Avoid using food, especially sweets, to keep Kendell from crying.        DEVELOPMENT/BEHAVIOR:  Most babies are becoming more mobile at 9 months.  They may roll, scoot, or crawl.  Some will try to pull up to a standing position.  Single consonant sounds (such as \"da\" or \"ba\") will lead to repetitive consonants (\"baba\" or \"padmini\").  You can encourage language development by pointing to objects, pictures, and body parts and saying the name.  At this age babies grab everything and it all goes into the mouth.  These movements become smoother and faster, so be careful.  Show your approval for  KARRIE CARLISLE 78y Female  MRN#: [id number]   CODE STATUS: FULL       SUBJECTIVE  Patient is a 78y old Female who presents with a chief complaint of Failure to thrive (2019 13:02)  Currently admitted to medicine with the primary diagnosis of Sepsis    Today is hospital day 1d, and this morning she is doing well, in no pain.       OBJECTIVE  PAST MEDICAL & SURGICAL HISTORY  COPD (chronic obstructive pulmonary disease)  Hypertension  Schizo affective schizophrenia  H/O total hysterectomy    ALLERGIES:  No Known Allergies    MEDICATIONS:  STANDING MEDICATIONS  chlorhexidine 4% Liquid 1 Application(s) Topical <User Schedule>  heparin  Injectable 5000 Unit(s) SubCutaneous every 8 hours  meropenem  IVPB 1000 milliGRAM(s) IV Intermittent every 8 hours  NIFEdipine XL 30 milliGRAM(s) Oral daily  risperiDONE   Tablet 4 milliGRAM(s) Oral two times a day    PRN MEDICATIONS      VITAL SIGNS: Last 24 Hours  T(C): 35.6 (2019 04:54), Max: 37.1 (2019 19:45)  T(F): 96.1 (2019 04:54), Max: 98.7 (2019 19:45)  HR: 95 (2019 04:54) (87 - 107)  BP: 144/70 (2019 04:54) (135/71 - 144/70)  BP(mean): --  RR: 18 (2019 04:54) (18 - 18)  SpO2: 92% (2019 15:40) (92% - 92%)    LABS:                        13.6   6.40  )-----------( 165      ( 2019 07:18 )             41.2         138  |  102  |  19  ----------------------------<  118<H>  3.4<L>   |  23  |  1.1    Ca    8.7      2019 07:18  Mg     1.9     07-08    TPro  6.5  /  Alb  3.2<L>  /  TBili  0.7  /  DBili  x   /  AST  33  /  ALT  58<H>  /  AlkPhos  94  07-09    PT/INR - ( 2019 12:09 )   PT: 13.60 sec;   INR: 1.18 ratio         PTT - ( 2019 12:09 )  PTT:31.3 sec  Urinalysis Basic - ( 2019 14:20 )    Color: Orange / Appearance: Cloudy / S.020 / pH: x  Gluc: x / Ketone: Trace  / Bili: Moderate / Urobili: 1.0 mg/dL   Blood: x / Protein: 30 mg/dL / Nitrite: Negative   Leuk Esterase: Small / RBC: x / WBC 3-5 /HPF   Sq Epi: x / Non Sq Epi: x / Bacteria: Few /HPF        Troponin T, Serum: 0.04 ng/mL <HH> (19 @ 12:09)      Culture - Blood (collected 2019 16:10)  Source: .Blood Blood  Gram Stain (2019 10:13):    Growth in aerobic bottle: Gram Negative Rods    Growth in anaerobic bottle: Gram Negative Rods  Preliminary Report (2019 10:13):    Growth in aerobic bottle: Gram Negative Rods    Growth in anaerobic bottle: Gram Negative Rods    "Due to technical problems, Proteus sp. will Not be reported as part of    the BCID panel until further notice" ***Blood Panel PCR results on this    specimenare available    approximately 3 hours after the Gram stain result.***    Gram stain, PCR, and/or culture results may not always    correspond due to difference in methodologies.    ************************************************************    This PCR assaywas performed using [employer].    The following targets are tested for: Enterococcus,    vancomycin resistant enterococci, Listeria monocytogenes,    coagulase negative staphylococci, S. aureus,    methicillin resistant S. aureus, Streptococcus agalactiae    (Group B), S. pneumoniae, S. pyogenes (Group A),    Acinetobacter baumannii, Enterobacter cloacae, E. coli,    Klebsiella oxytoca, K. pneumoniae, Proteus sp.,    Serratia marcescens, Haemophilus influenzae,    Neisseria meningitidis, Pseudomonas aeruginosa, Candida    albicans, C. glabrata, C krusei, C parapsilosis,    C. tropicalis and the KPC resistance gene.  Organism: Blood Culture PCR (2019 11:10)  Organism: Blood Culture PCR (2019 11:10)    Culture - Blood (collected 2019 16:00)  Source: .Blood Blood  Preliminary Report (2019 01:01):    No growth to date.    Culture - Urine (collected 2019 13:35)  Source: .Urine Clean Catch (Midstream)  Final Report (2019 21:09):    No growth      CARDIAC MARKERS ( 2019 12:09 )  x     / 0.04 ng/mL / x     / x     / x      CARDIAC MARKERS ( 2019 18:22 )  x     / 0.03 ng/mL / x     / x     / x      CARDIAC MARKERS ( 2019 14:20 )  x     / <0.01 ng/mL / x     / x     / x          RADIOLOGY:  < from: [facility] Duplex Upper Ext Vein Scan, Right (19 @ 08:46) >    Impression:    No evidence of deep or superficial venous thrombosis in the right upper   extremity.    < end of copied text >    < from: US Abdomen Limited (19 @ 08:45) >    IMPRESSION:    Gallbladder stones and sludge, without sonographic evidence of acute   cholecystitis.    < end of copied text >    PHYSICAL EXAM:    GENERAL: NAD, AAOx3  HEENT: EOMI, conjunctiva and sclera clear, No JVD  PULMONARY: Clear to auscultation bilaterally; No wheeze  CARDIOVASCULAR: Regular rate and rhythm; No murmurs, rubs, or gallops  GASTROINTESTINAL: Soft, Nontender, Nondistended  MUSCULOSKELETAL:  2+ Peripheral Pulses  NEUROLOGY: non-focal  SKIN: No rashes       ADMISSION SUMMARY  Patient is a 78y old Female who presents with a chief complaint of Failure to thrive (2019 13:02)  Currently admitted to medicine with the primary diagnosis of Sepsis      ASSESSMENT AND PLAN   77y/o w/ past medical history of hypertension (not on any meds), COPD (not on any meds), and schizophrenia presenting w/ chief complaint of decreased PO intake and inability to ambulate found to have ROSSY and mild troponemia.      #Sepsis POA (leukocytosis, tachycardia) 2/2 E. Coli Bacteremia   - d/c zosyn, started meropenem   - repeat blood cultures    - ID consult     #Acute kidney injury, resolved   - likely pre-renal secondary to dehydration as pt is unable to care for herself and daughter works during the day  - Cr 1.6 on admission (baseline normal 1.1 in 2018), currently 1.1   - d/c fluids  -Trend BMP and send urine studies if no improvement  -No evidence of hydronephrosis on CT A/P    #Transaminitis  - AST/ALT: 107/133.  Bilirubin 1.4. Mixed hepatocellular/cholestatic pattern (downtrending liver enzymes)   -No evidence of acute liver failure  - Differential Dx includes choledocholithiasis vs Hepatitis vs autoimmune process vs NFLD  - CT A/P w/ contrast negative  - US RUQ ordered- gallbladder stones & sludge w/o sonographic evidence of acute cholecystitis   - Hep panel, JORDI, Anti-SM Ab, Ferritin, Ceruloplasmin, and TSH levels ordered, will f/u labs     #Mild Troponemia  - Trops 0.01 -> 0.03 --> 0.04  - Pt denies chest pain, sob, palpitations, nausea or vomiting. EKG negative for ischemic changes  -f/u CK-MB at 11 AM   - TTE ordered     #Failure to thrive  - Decreased PO intake, decreased ambulation, dehydration  - Calorie count and dietary eval requested  - Dash diet with 1:1 PO feeds  - PT/Rehab eval requested  - Daughter will prefer pt to go back home on discharge, not SNF. She mentioned she will be coming in tmrw morning to further discuss the matter.    #Abdominal wall hernia with loop of non-obstructed large bowel  -Incidental finding on CT scan  - Pt seen by surgery, f/u w/ Matt Viveros as outpt     #Schizophrenia  - C/w Risperidone 4mg q12hr    #Leukocytosis  - CXR negative for acute cardiopulmonary disease. U/A -ve  - Trend CBCs (downtrending, WBC at 6 now)     Diet: DASH  DVT PPX: Heparin SQ  Activity: out of bed to chair   Dispo: will discuss with daughter   Code Status: Full desirable actions with smiles and kind words.  The more we use the word \"no\" to children the more they will use it back to us a hundred times more.  Using the phrase please don't, let's not or let's do this instead is a better choice.  Using \"no\" in a frost voice is appropriate if the child's action may harm them.    Most babies at this age can sleep all night, often ten to twelve hours.  Most babies take at least one nap a day, but a few do not nap at all.    ACCIDENT PREVENTION:  1. Kitchen:  Hot liquids, hot foods, and electrical cords must be kept out of reach. Move cleaning products up from under sinks.  Use outlet protectors and hide extension cords.  2. Stairs:  Use bass or block stairs off with furniture.  3. Windows:  Use locks or guards, especially on upstairs windows. Babies have fallen through screens by leaning on them.  4. Water Safety:  Empty any buckets of water.  Children have drowned in buckets, especially the five-gallon construction-material type.  Fence off permanent pools and drain wading pools when not in use.  Never leave Kendell alone in the tub.  Even babies who have had swimming classes are not safe alone in the tub.  Keep toilet seat lids down.  5. Medications:  If prescribed medication needs to be refrigerated, please store it at the back of the refrigerator to prevent Kendell from reaching it or spilling it.  6. Poisoning:  Remove the following items from reach or place them in a locked cabinet:     Cleaning products     Kerosene (lamp oil)     Paint     Pesticides     Purses (which sometimes contain medicines)     Plants      Medicines, including vitamins and birth control pills     Use safety caps on medicines. Household  and polishes must be kept out of reach. Store medicines and  out of sight. Don't transfer medicines to non-child-proofed or unlabeled containers. Dispose of unused medications. Be especially careful when visiting older persons or families without  children in the house.  They may be in the habit of keeping their medicines out on tables.    Syrup of Ipecac is no longer recommended to be kept in the home. Please dispose of any remaining supplies.    Call the Poison Center at 1-685.314.3785 for any known or suspected poisoning.    CAR SAFETY:  An approved rear facing car seat in the back seat of the car is required by [place] law until two years. A rear-facing car seat in the back seat is the safest place for him. This is especially true if your car is equipped with passenger-side air bags.      SMOKING:  Children exposed to tobacco smoke have more ear infections and pneumonia. Cold symptoms last longer. If you smoke, please quit.  If you cannot quit, smoke outside. Do not smoke near Kendell, and do not let others smoke near him. Do not smoke in the car.     SHOES: Landrys feet will develop fine whether they have shoes or not.  He can run barefoot where safe. Remember there are many things that can hurt your child's feet (ex. small shoes, sharp objects or hot surfaces). Shoes provide safety, warmth, protection and, of course, decoration. Choose shoes that have a roomy fit and flat, flexible soles with nonskid bottoms.    SUN EXPOSURE:  If you plan to have Kendell outside for more than 30 minutes, please use sunscreen.    TEETHING:  Teething children may have no symptoms at all, or they may experience drooling, rashes, crabbiness, or changes in diet. Do not assume that a fever is due to teething. Temperatures over 101 degrees are usually from some other cause. Once the teeth erupt, you should begin cleaning Landrys teeth with a washcloth, gauze, or soft toothbrush. Toothpaste is not necessary yet.    LEAD EXPOSURE:  Kendell will be more mobile in the next few months. If there is lead in the house, he may be vulnerable. Let us know if any of the following apply:  1. Your home was built before 1978 and has peeling or chipping or chalking paint. Homes built in  KARRIE CARLISLE 78y Female  MRN#: [id number]   CODE STATUS: FULL       SUBJECTIVE  Patient is a 78y old Female who presents with a chief complaint of Failure to thrive (2019 13:02)  Currently admitted to medicine with the primary diagnosis of Sepsis    Today is hospital day 1d, and this morning she is doing well, in no pain.       OBJECTIVE  PAST MEDICAL & SURGICAL HISTORY  COPD (chronic obstructive pulmonary disease)  Hypertension  Schizo affective schizophrenia  H/O total hysterectomy    ALLERGIES:  No Known Allergies    MEDICATIONS:  STANDING MEDICATIONS  chlorhexidine 4% Liquid 1 Application(s) Topical <User Schedule>  heparin  Injectable 5000 Unit(s) SubCutaneous every 8 hours  meropenem  IVPB 1000 milliGRAM(s) IV Intermittent every 8 hours  NIFEdipine XL 30 milliGRAM(s) Oral daily  risperiDONE   Tablet 4 milliGRAM(s) Oral two times a day    PRN MEDICATIONS      VITAL SIGNS: Last 24 Hours  T(C): 35.6 (2019 04:54), Max: 37.1 (2019 19:45)  T(F): 96.1 (2019 04:54), Max: 98.7 (2019 19:45)  HR: 95 (2019 04:54) (87 - 107)  BP: 144/70 (2019 04:54) (135/71 - 144/70)  BP(mean): --  RR: 18 (2019 04:54) (18 - 18)  SpO2: 92% (2019 15:40) (92% - 92%)    LABS:                        13.6   6.40  )-----------( 165      ( 2019 07:18 )             41.2         138  |  102  |  19  ----------------------------<  118<H>  3.4<L>   |  23  |  1.1    Ca    8.7      2019 07:18  Mg     1.9     07-08    TPro  6.5  /  Alb  3.2<L>  /  TBili  0.7  /  DBili  x   /  AST  33  /  ALT  58<H>  /  AlkPhos  94  07-09    PT/INR - ( 2019 12:09 )   PT: 13.60 sec;   INR: 1.18 ratio         PTT - ( 2019 12:09 )  PTT:31.3 sec  Urinalysis Basic - ( 2019 14:20 )    Color: Orange / Appearance: Cloudy / S.020 / pH: x  Gluc: x / Ketone: Trace  / Bili: Moderate / Urobili: 1.0 mg/dL   Blood: x / Protein: 30 mg/dL / Nitrite: Negative   Leuk Esterase: Small / RBC: x / WBC 3-5 /HPF   Sq Epi: x / Non Sq Epi: x / Bacteria: Few /HPF        Troponin T, Serum: 0.04 ng/mL <HH> (19 @ 12:09)      Culture - Blood (collected 2019 16:10)  Source: .Blood Blood  Gram Stain (2019 10:13):    Growth in aerobic bottle: Gram Negative Rods    Growth in anaerobic bottle: Gram Negative Rods  Preliminary Report (2019 10:13):    Growth in aerobic bottle: Gram Negative Rods    Growth in anaerobic bottle: Gram Negative Rods    "Due to technical problems, Proteus sp. will Not be reported as part of    the BCID panel until further notice" ***Blood Panel PCR results on this    specimenare available    approximately 3 hours after the Gram stain result.***    Gram stain, PCR, and/or culture results may not always    correspond due to difference in methodologies.    ************************************************************    This PCR assaywas performed using [employer].    The following targets are tested for: Enterococcus,    vancomycin resistant enterococci, Listeria monocytogenes,    coagulase negative staphylococci, S. aureus,    methicillin resistant S. aureus, Streptococcus agalactiae    (Group B), S. pneumoniae, S. pyogenes (Group A),    Acinetobacter baumannii, Enterobacter cloacae, E. coli,    Klebsiella oxytoca, K. pneumoniae, Proteus sp.,    Serratia marcescens, Haemophilus influenzae,    Neisseria meningitidis, Pseudomonas aeruginosa, Candida    albicans, C. glabrata, C krusei, C parapsilosis,    C. tropicalis and the KPC resistance gene.  Organism: Blood Culture PCR (2019 11:10)  Organism: Blood Culture PCR (2019 11:10)    Culture - Blood (collected 2019 16:00)  Source: .Blood Blood  Preliminary Report (2019 01:01):    No growth to date.    Culture - Urine (collected 2019 13:35)  Source: .Urine Clean Catch (Midstream)  Final Report (2019 21:09):    No growth      CARDIAC MARKERS ( 2019 12:09 )  x     / 0.04 ng/mL / x     / x     / x      CARDIAC MARKERS ( 2019 18:22 )  x     / 0.03 ng/mL / x     / x     / x      CARDIAC MARKERS ( 2019 14:20 )  x     / <0.01 ng/mL / x     / x     / x          RADIOLOGY:  < from: [facility] Duplex Upper Ext Vein Scan, Right (19 @ 08:46) >    Impression:    No evidence of deep or superficial venous thrombosis in the right upper   extremity.    < end of copied text >    < from: US Abdomen Limited (19 @ 08:45) >    IMPRESSION:    Gallbladder stones and sludge, without sonographic evidence of acute   cholecystitis.    < end of copied text >    PHYSICAL EXAM:    GENERAL: NAD, AAOx3  HEENT: EOMI, conjunctiva and sclera clear, No JVD  PULMONARY: Clear to auscultation bilaterally; No wheeze  CARDIOVASCULAR: Regular rate and rhythm; No murmurs, rubs, or gallops  GASTROINTESTINAL: Soft, Nontender, Nondistended  MUSCULOSKELETAL:  2+ Peripheral Pulses  NEUROLOGY: non-focal  SKIN: No rashes       ADMISSION SUMMARY  Patient is a 78y old Female who presents with a chief complaint of Failure to thrive (2019 13:02)  Currently admitted to medicine with the primary diagnosis of Sepsis      ASSESSMENT AND PLAN   79y/o w/ past medical history of hypertension (not on any meds), COPD (not on any meds), and schizophrenia presenting w/ chief complaint of decreased PO intake and inability to ambulate found to have ROSSY and mild troponemia.      #Sepsis POA (leukocytosis, tachycardia) 2/2 E. Coli Bacteremia   - d/c zosyn, started meropenem   - repeat blood cultures    - ID consult     #Acute kidney injury, resolved   - likely pre-renal secondary to dehydration as pt is unable to care for herself and daughter works during the day  - Cr 1.6 on admission (baseline normal 1.1 in 2018), currently 1.1   - d/c fluids  -Trend BMP and send urine studies if no improvement  -No evidence of hydronephrosis on CT A/P    #Transaminitis  - AST/ALT: 107/133.  Bilirubin 1.4. Mixed hepatocellular/cholestatic pattern (downtrending liver enzymes)   -No evidence of acute liver failure  - Differential Dx includes choledocholithiasis vs Hepatitis vs autoimmune process vs NFLD  - CT A/P w/ contrast negative  - US RUQ ordered- gallbladder stones & sludge w/o sonographic evidence of acute cholecystitis   - Hep panel, JORDI, Anti-SM Ab, Ferritin, Ceruloplasmin, and TSH levels ordered, will f/u labs     #Mild Troponemia  - Trops 0.01 -> 0.03 --> 0.04  - Pt denies chest pain, sob, palpitations, nausea or vomiting. EKG negative for ischemic changes  -f/u CK-MB at 11 AM   - TTE ordered     #Failure to thrive  - Decreased PO intake, decreased ambulation, dehydration  - Calorie count and dietary eval requested  - Dash diet with 1:1 PO feeds  - PT/Rehab eval requested  - Daughter will prefer pt to go back home on discharge, not SNF. She mentioned she will be coming in tmrw morning to further discuss the matter.    #Abdominal wall hernia with loop of non-obstructed large bowel  -Incidental finding on CT scan  - Pt seen by surgery, f/u w/ Matt Viveros as outpt     #Schizophrenia  - C/w Risperidone 4mg q12hr    #Leukocytosis  - CXR negative for acute cardiopulmonary disease. U/A -ve  - Trend CBCs (downtrending, WBC at 6 now)     #HTN   -not on home meds  -taking nifedipine here     #COPD  -not on home meds   -controlled for now, will continue to monitor     Diet: DASH  DVT PPX: Heparin SQ  Activity: out of bed to chair   Dispo: will discuss with daughter   Code Status: Full older cities at the turn of the last century may have lead pipes. Check to see if any of the above applies to Kendell's sitter's home, , , or any other house where he spends time.  2. You have another child or housemate who is being followed or treated for an elevated lead level.  3. Kendell lives with an adult whose job or hobby involves lead exposure (soldering, battery manufacture, recycling, casting, stained glass, etc.).  4. You live near an active lead smelter, a battery recycling plant, or a plant that processes hot metal.    TUBERCULOSIS:  There is such a low rate of tuberculosis in our area that frequent skin tests are no longer recommended.  However, certain situations do increase Kendell's risk, including contact with AIDS patients, nursing home residents, prisoners, immigrants, or homeless persons.  Please let us know if Kendell has contacts with such persons, or if he has contact with anyone known to have or suspected of having tuberculosis.    MEDICATION FOR FEVER OR PAIN:   Acetaminophen liquid (e.g., Tylenol or Tempra) may be given every four hours as needed for pain or fever.  Be sure to check which product CONCENTRATION you are using.    INFANT/CHILDREN’S Tylenol/Acetaminophen  (160 MG/5 mL)  Child’s Weight: Dose:  12 - 17 pounds:   80 mg (2.5 mL  (1/2 Teaspoon))  18 - 23 pounds:   120 mg (3.75 mL (3/4 Teaspoon))    INFANT Ibuprofen (50 mg/1.25 ml)  liquid (for example Advil or Motrin) may be given every 6 hours as needed for pain or fever.    Child’s Weight: Dose:  12 - 17 pounds:           50 mg (1.25 mL)    18 - 23 pounds:           75 mg (1.875 mL)    CHILDREN'S Ibuprofen (100 mg/5 mL) liquid (for example Advil or Motrin) may be given every 6 hours as needed for pain or fever.    Child’s Weight: Dose:  12 - 17 pounds:           50 mg (2.5 mL (1/2 Teaspoon))  18 - 23 pounds:           75 mg (3.75  mL (3/4 Teaspoon))    If Kendell is outside these weight ranges, call your  KARRIE CARLISLE 78y Female  MRN#: [id number]   CODE STATUS: FULL       SUBJECTIVE  Patient is a 78y old Female who presents with a chief complaint of Failure to thrive (2019 13:02)  Currently admitted to medicine with the primary diagnosis of Sepsis    Today is hospital day 1d, and this morning she is doing well, in no pain.       OBJECTIVE  PAST MEDICAL & SURGICAL HISTORY  COPD (chronic obstructive pulmonary disease)  Hypertension  Schizo affective schizophrenia  H/O total hysterectomy    ALLERGIES:  No Known Allergies    MEDICATIONS:  STANDING MEDICATIONS  chlorhexidine 4% Liquid 1 Application(s) Topical <User Schedule>  heparin  Injectable 5000 Unit(s) SubCutaneous every 8 hours  meropenem  IVPB 1000 milliGRAM(s) IV Intermittent every 8 hours  NIFEdipine XL 30 milliGRAM(s) Oral daily  risperiDONE   Tablet 4 milliGRAM(s) Oral two times a day    PRN MEDICATIONS      VITAL SIGNS: Last 24 Hours  T(C): 35.6 (2019 04:54), Max: 37.1 (2019 19:45)  T(F): 96.1 (2019 04:54), Max: 98.7 (2019 19:45)  HR: 95 (2019 04:54) (87 - 107)  BP: 144/70 (2019 04:54) (135/71 - 144/70)  BP(mean): --  RR: 18 (2019 04:54) (18 - 18)  SpO2: 92% (2019 15:40) (92% - 92%)    LABS:                        13.6   6.40  )-----------( 165      ( 2019 07:18 )             41.2         138  |  102  |  19  ----------------------------<  118<H>  3.4<L>   |  23  |  1.1    Ca    8.7      2019 07:18  Mg     1.9     07-08    TPro  6.5  /  Alb  3.2<L>  /  TBili  0.7  /  DBili  x   /  AST  33  /  ALT  58<H>  /  AlkPhos  94  07-09    PT/INR - ( 2019 12:09 )   PT: 13.60 sec;   INR: 1.18 ratio         PTT - ( 2019 12:09 )  PTT:31.3 sec  Urinalysis Basic - ( 2019 14:20 )    Color: Orange / Appearance: Cloudy / S.020 / pH: x  Gluc: x / Ketone: Trace  / Bili: Moderate / Urobili: 1.0 mg/dL   Blood: x / Protein: 30 mg/dL / Nitrite: Negative   Leuk Esterase: Small / RBC: x / WBC 3-5 /HPF   Sq Epi: x / Non Sq Epi: x / Bacteria: Few /HPF        Troponin T, Serum: 0.04 ng/mL <HH> (19 @ 12:09)      Culture - Blood (collected 2019 16:10)  Source: .Blood Blood  Gram Stain (2019 10:13):    Growth in aerobic bottle: Gram Negative Rods    Growth in anaerobic bottle: Gram Negative Rods  Preliminary Report (2019 10:13):    Growth in aerobic bottle: Gram Negative Rods    Growth in anaerobic bottle: Gram Negative Rods    "Due to technical problems, Proteus sp. will Not be reported as part of    the BCID panel until further notice" ***Blood Panel PCR results on this    specimenare available    approximately 3 hours after the Gram stain result.***    Gram stain, PCR, and/or culture results may not always    correspond due to difference in methodologies.    ************************************************************    This PCR assaywas performed using [employer].    The following targets are tested for: Enterococcus,    vancomycin resistant enterococci, Listeria monocytogenes,    coagulase negative staphylococci, S. aureus,    methicillin resistant S. aureus, Streptococcus agalactiae    (Group B), S. pneumoniae, S. pyogenes (Group A),    Acinetobacter baumannii, Enterobacter cloacae, E. coli,    Klebsiella oxytoca, K. pneumoniae, Proteus sp.,    Serratia marcescens, Haemophilus influenzae,    Neisseria meningitidis, Pseudomonas aeruginosa, Candida    albicans, C. glabrata, C krusei, C parapsilosis,    C. tropicalis and the KPC resistance gene.  Organism: Blood Culture PCR (2019 11:10)  Organism: Blood Culture PCR (2019 11:10)    Culture - Blood (collected 2019 16:00)  Source: .Blood Blood  Preliminary Report (2019 01:01):    No growth to date.    Culture - Urine (collected 2019 13:35)  Source: .Urine Clean Catch (Midstream)  Final Report (2019 21:09):    No growth      CARDIAC MARKERS ( 2019 12:09 )  x     / 0.04 ng/mL / x     / x     / x      CARDIAC MARKERS ( 2019 18:22 )  x     / 0.03 ng/mL / x     / x     / x      CARDIAC MARKERS ( 2019 14:20 )  x     / <0.01 ng/mL / x     / x     / x          RADIOLOGY:  < from: [facility] Duplex Upper Ext Vein Scan, Right (19 @ 08:46) >    Impression:    No evidence of deep or superficial venous thrombosis in the right upper   extremity.    < end of copied text >    < from: US Abdomen Limited (19 @ 08:45) >    IMPRESSION:    Gallbladder stones and sludge, without sonographic evidence of acute   cholecystitis.    < end of copied text >    PHYSICAL EXAM:    GENERAL: NAD, AAOx3  HEENT: EOMI, conjunctiva and sclera clear, No JVD  PULMONARY: Clear to auscultation bilaterally; No wheeze  CARDIOVASCULAR: Regular rate and rhythm; No murmurs, rubs, or gallops  GASTROINTESTINAL: Soft, Nontender, Nondistended  MUSCULOSKELETAL:  2+ Peripheral Pulses  NEUROLOGY: non-focal  SKIN: No rashes       ADMISSION SUMMARY  Patient is a 78y old Female who presents with a chief complaint of Failure to thrive (2019 13:02)  Currently admitted to medicine with the primary diagnosis of Sepsis      ASSESSMENT AND PLAN   77y/o w/ past medical history of hypertension (not on any meds), COPD (not on any meds), and schizophrenia presenting w/ chief complaint of decreased PO intake and inability to ambulate found to have ROSSY and mild troponemia.      #Sepsis POA (leukocytosis, tachycardia) 2/2 E. Coli Bacteremia   - d/c zosyn, started meropenem   - repeat blood cultures, also positive for gram + rods   - ID consult     #Acute kidney injury, resolved   - likely pre-renal secondary to dehydration as pt is unable to care for herself and daughter works during the day  - Cr 1.6 on admission (baseline normal 1.1 in 2018), currently 1.1   - d/c fluids  -Trend BMP and send urine studies if no improvement  -No evidence of hydronephrosis on CT A/P    #Transaminitis  - AST/ALT: 107/133.  Bilirubin 1.4. Mixed hepatocellular/cholestatic pattern (downtrending liver enzymes)   -No evidence of acute liver failure  - Differential Dx includes choledocholithiasis vs Hepatitis vs autoimmune process vs NFLD  - CT A/P w/ contrast negative  - US RUQ ordered- gallbladder stones & sludge w/o sonographic evidence of acute cholecystitis   - Hep panel, JORDI, Anti-SM Ab, Ferritin, Ceruloplasmin, and TSH levels ordered, will f/u labs     #Mild Troponemia  - Trops 0.01 -> 0.03 --> 0.04  - Pt denies chest pain, sob, palpitations, nausea or vomiting. EKG negative for ischemic changes  -f/u CK-MB at 11 AM, 3.7   - TTE ordered     #Failure to thrive  - Decreased PO intake, decreased ambulation, dehydration  - Calorie count and dietary eval requested  - Dash diet with 1:1 PO feeds  - PT/Rehab eval requested  - Daughter will prefer pt to go back home on discharge, not SNF. She mentioned she will be coming in tmrw morning to further discuss the matter.    #Abdominal wall hernia with loop of non-obstructed large bowel  -Incidental finding on CT scan  - Pt seen by surgery, f/u w/ Matt Viveros as outpt     #Schizophrenia  - C/w Risperidone 4mg q12hr    #Leukocytosis  - CXR negative for acute cardiopulmonary disease. U/A -ve  - Trend CBCs (downtrending, WBC at 6 now)     #HTN   -not on home meds  -taking nifedipine here     #COPD  -not on home meds   -controlled for now, will continue to monitor     Diet: DASH  DVT PPX: Heparin SQ  Activity: out of bed to chair   Dispo: will discuss with daughter   Code Status: Full pediatrician's office for advice.    Most Recent Immunizations   Administered Date(s) Administered   • DTaP/Hep B/IPV 2018   • Hep B, adolescent or pediatric 2018   • Hib (PRP-T) 2018   • Influenza, injectable, quadrivalent, preservative-free 01/22/2019   • Pneumococcal Conjugate 13 valent 2018   • Rotavirus - pentavalent 2018       If Kendell develops any of the following reactions within 72 hours after an immunization, notify your pediatrician by calling the pediatric phone nurse:  1. A temperature of 105 degrees or above.  2. More than 3 hours of continuous crying.  3. A shrill, high-pitched cry.  4. A pale, limp spell.  5. A seizure or fainting spell.  In this case, you should call 911 or go immediately to the emergency room.    NEXT VISIT: ONE YEAR OF AGE    NOTE:  Kendell should have the One-Year-Old Exam after his first birthday.  The immunizations given at that visit must take place after Kendell’s birthday in order to meet [facility] requirements.    Thank you for entrusting your care to [facility].   KARRIE CARLISLE 78y Female  MRN#: [id number]   CODE STATUS: FULL       SUBJECTIVE  Patient is a 78y old Female who presents with a chief complaint of Failure to thrive (2019 13:02)  Currently admitted to medicine with the primary diagnosis of Sepsis    Today is hospital day 1d, and this morning she is doing well, in no pain.       OBJECTIVE  PAST MEDICAL & SURGICAL HISTORY  COPD (chronic obstructive pulmonary disease)  Hypertension  Schizo affective schizophrenia  H/O total hysterectomy    ALLERGIES:  No Known Allergies    MEDICATIONS:  STANDING MEDICATIONS  chlorhexidine 4% Liquid 1 Application(s) Topical <User Schedule>  heparin  Injectable 5000 Unit(s) SubCutaneous every 8 hours  meropenem  IVPB 1000 milliGRAM(s) IV Intermittent every 8 hours  NIFEdipine XL 30 milliGRAM(s) Oral daily  risperiDONE   Tablet 4 milliGRAM(s) Oral two times a day    PRN MEDICATIONS      VITAL SIGNS: Last 24 Hours  T(C): 35.6 (2019 04:54), Max: 37.1 (2019 19:45)  T(F): 96.1 (2019 04:54), Max: 98.7 (2019 19:45)  HR: 95 (2019 04:54) (87 - 107)  BP: 144/70 (2019 04:54) (135/71 - 144/70)  BP(mean): --  RR: 18 (2019 04:54) (18 - 18)  SpO2: 92% (2019 15:40) (92% - 92%)    LABS:                        13.6   6.40  )-----------( 165      ( 2019 07:18 )             41.2         138  |  102  |  19  ----------------------------<  118<H>  3.4<L>   |  23  |  1.1    Ca    8.7      2019 07:18  Mg     1.9     07-08    TPro  6.5  /  Alb  3.2<L>  /  TBili  0.7  /  DBili  x   /  AST  33  /  ALT  58<H>  /  AlkPhos  94  07-09    PT/INR - ( 2019 12:09 )   PT: 13.60 sec;   INR: 1.18 ratio         PTT - ( 2019 12:09 )  PTT:31.3 sec  Urinalysis Basic - ( 2019 14:20 )    Color: Orange / Appearance: Cloudy / S.020 / pH: x  Gluc: x / Ketone: Trace  / Bili: Moderate / Urobili: 1.0 mg/dL   Blood: x / Protein: 30 mg/dL / Nitrite: Negative   Leuk Esterase: Small / RBC: x / WBC 3-5 /HPF   Sq Epi: x / Non Sq Epi: x / Bacteria: Few /HPF        Troponin T, Serum: 0.04 ng/mL <HH> (19 @ 12:09)      Culture - Blood (collected 2019 16:10)  Source: .Blood Blood  Gram Stain (2019 10:13):    Growth in aerobic bottle: Gram Negative Rods    Growth in anaerobic bottle: Gram Negative Rods  Preliminary Report (2019 10:13):    Growth in aerobic bottle: Gram Negative Rods    Growth in anaerobic bottle: Gram Negative Rods    "Due to technical problems, Proteus sp. will Not be reported as part of    the BCID panel until further notice" ***Blood Panel PCR results on this    specimenare available    approximately 3 hours after the Gram stain result.***    Gram stain, PCR, and/or culture results may not always    correspond due to difference in methodologies.    ************************************************************    This PCR assaywas performed using [employer].    The following targets are tested for: Enterococcus,    vancomycin resistant enterococci, Listeria monocytogenes,    coagulase negative staphylococci, S. aureus,    methicillin resistant S. aureus, Streptococcus agalactiae    (Group B), S. pneumoniae, S. pyogenes (Group A),    Acinetobacter baumannii, Enterobacter cloacae, E. coli,    Klebsiella oxytoca, K. pneumoniae, Proteus sp.,    Serratia marcescens, Haemophilus influenzae,    Neisseria meningitidis, Pseudomonas aeruginosa, Candida    albicans, C. glabrata, C krusei, C parapsilosis,    C. tropicalis and the KPC resistance gene.  Organism: Blood Culture PCR (2019 11:10)  Organism: Blood Culture PCR (2019 11:10)    Culture - Blood (collected 2019 16:00)  Source: .Blood Blood  Preliminary Report (2019 01:01):    No growth to date.    Culture - Urine (collected 2019 13:35)  Source: .Urine Clean Catch (Midstream)  Final Report (2019 21:09):    No growth      CARDIAC MARKERS ( 2019 12:09 )  x     / 0.04 ng/mL / x     / x     / x      CARDIAC MARKERS ( 2019 18:22 )  x     / 0.03 ng/mL / x     / x     / x      CARDIAC MARKERS ( 2019 14:20 )  x     / <0.01 ng/mL / x     / x     / x          RADIOLOGY:  < from: [facility] Duplex Upper Ext Vein Scan, Right (19 @ 08:46) >    Impression:    No evidence of deep or superficial venous thrombosis in the right upper   extremity.    < end of copied text >    < from: US Abdomen Limited (19 @ 08:45) >    IMPRESSION:    Gallbladder stones and sludge, without sonographic evidence of acute   cholecystitis.    < end of copied text >    PHYSICAL EXAM:    GENERAL: NAD, AAOx3  HEENT: EOMI, conjunctiva and sclera clear, No JVD  PULMONARY: Clear to auscultation bilaterally; No wheeze  CARDIOVASCULAR: Regular rate and rhythm; No murmurs, rubs, or gallops  GASTROINTESTINAL: Soft, Nontender, Nondistended  MUSCULOSKELETAL:  2+ Peripheral Pulses  NEUROLOGY: non-focal  SKIN: No rashes       ADMISSION SUMMARY  Patient is a 78y old Female who presents with a chief complaint of Failure to thrive (2019 13:02)  Currently admitted to medicine with the primary diagnosis of Sepsis      ASSESSMENT AND PLAN   79y/o w/ past medical history of hypertension (not on any meds), COPD (not on any meds), and schizophrenia presenting w/ chief complaint of decreased PO intake and inability to ambulate found to have ROSSY and mild troponemia.      #Sepsis POA (leukocytosis, tachycardia) 2/2 E. Coli Bacteremia   - restarted zosyn in light of gram + rods bacteremia as well   - repeat blood cultures, also positive for gram + rods   - ID consult   -awaiting sensitivities     #Acute kidney injury, resolved   - likely pre-renal secondary to dehydration as pt is unable to care for herself and daughter works during the day  - Cr 1.6 on admission (baseline normal 1.1 in 2018), currently 1.1   - d/c fluids  -Trend BMP and send urine studies if no improvement  -No evidence of hydronephrosis on CT A/P    #Transaminitis  - AST/ALT: 107/133.  Bilirubin 1.4. Mixed hepatocellular/cholestatic pattern (downtrending liver enzymes)   -No evidence of acute liver failure  - Differential Dx includes choledocholithiasis vs Hepatitis vs autoimmune process vs NFLD  - CT A/P w/ contrast negative  - US RUQ ordered- gallbladder stones & sludge w/o sonographic evidence of acute cholecystitis   - Hep panel, JORDI, Anti-SM Ab, Ferritin, Ceruloplasmin, and TSH levels ordered, will f/u labs     #Mild Troponemia  - Trops 0.01 -> 0.03 --> 0.04  - Pt denies chest pain, sob, palpitations, nausea or vomiting. EKG negative for ischemic changes  -f/u CK-MB at 11 AM, 3.7   - TTE ordered     #Failure to thrive  - Decreased PO intake, decreased ambulation, dehydration  - Calorie count and dietary eval requested  - Dash diet with 1:1 PO feeds  - PT/Rehab eval requested  - Daughter will prefer pt to go back home on discharge, not SNF. She mentioned she will be coming in tmrw morning to further discuss the matter.    #Abdominal wall hernia with loop of non-obstructed large bowel  -Incidental finding on CT scan  - Pt seen by surgery, f/u w/ Matt Viveros as outpt     #Schizophrenia  - C/w Risperidone 4mg q12hr    #Leukocytosis  - CXR negative for acute cardiopulmonary disease. U/A -ve  - Trend CBCs (downtrending, WBC at 6 now)     #HTN   -not on home meds  -taking nifedipine here     #COPD  -not on home meds   -controlled for now, will continue to monitor     Diet: DASH  DVT PPX: Heparin SQ  Activity: out of bed to chair   Dispo: will discuss with daughter   Code Status: Full KARRIE CARLISLE 78y Female  MRN#: [id number]   CODE STATUS: FULL       SUBJECTIVE  Patient is a 78y old Female who presents with a chief complaint of Failure to thrive (2019 13:02)  Currently admitted to medicine with the primary diagnosis of Sepsis    Today is hospital day 1d, and this morning she is doing well, in no pain.       OBJECTIVE  PAST MEDICAL & SURGICAL HISTORY  COPD (chronic obstructive pulmonary disease)  Hypertension  Schizo affective schizophrenia  H/O total hysterectomy    ALLERGIES:  No Known Allergies    MEDICATIONS:  STANDING MEDICATIONS  chlorhexidine 4% Liquid 1 Application(s) Topical <User Schedule>  heparin  Injectable 5000 Unit(s) SubCutaneous every 8 hours  meropenem  IVPB 1000 milliGRAM(s) IV Intermittent every 8 hours  NIFEdipine XL 30 milliGRAM(s) Oral daily  risperiDONE   Tablet 4 milliGRAM(s) Oral two times a day    PRN MEDICATIONS      VITAL SIGNS: Last 24 Hours  T(C): 35.6 (2019 04:54), Max: 37.1 (2019 19:45)  T(F): 96.1 (2019 04:54), Max: 98.7 (2019 19:45)  HR: 95 (2019 04:54) (87 - 107)  BP: 144/70 (2019 04:54) (135/71 - 144/70)  BP(mean): --  RR: 18 (2019 04:54) (18 - 18)  SpO2: 92% (2019 15:40) (92% - 92%)    LABS:                        13.6   6.40  )-----------( 165      ( 2019 07:18 )             41.2         138  |  102  |  19  ----------------------------<  118<H>  3.4<L>   |  23  |  1.1    Ca    8.7      2019 07:18  Mg     1.9     07-08    TPro  6.5  /  Alb  3.2<L>  /  TBili  0.7  /  DBili  x   /  AST  33  /  ALT  58<H>  /  AlkPhos  94  07-09    PT/INR - ( 2019 12:09 )   PT: 13.60 sec;   INR: 1.18 ratio         PTT - ( 2019 12:09 )  PTT:31.3 sec  Urinalysis Basic - ( 2019 14:20 )    Color: Orange / Appearance: Cloudy / S.020 / pH: x  Gluc: x / Ketone: Trace  / Bili: Moderate / Urobili: 1.0 mg/dL   Blood: x / Protein: 30 mg/dL / Nitrite: Negative   Leuk Esterase: Small / RBC: x / WBC 3-5 /HPF   Sq Epi: x / Non Sq Epi: x / Bacteria: Few /HPF        Troponin T, Serum: 0.04 ng/mL <HH> (19 @ 12:09)      Culture - Blood (collected 2019 16:10)  Source: .Blood Blood  Gram Stain (2019 10:13):    Growth in aerobic bottle: Gram Negative Rods    Growth in anaerobic bottle: Gram Negative Rods  Preliminary Report (2019 10:13):    Growth in aerobic bottle: Gram Negative Rods    Growth in anaerobic bottle: Gram Negative Rods    "Due to technical problems, Proteus sp. will Not be reported as part of    the BCID panel until further notice" ***Blood Panel PCR results on this    specimenare available    approximately 3 hours after the Gram stain result.***    Gram stain, PCR, and/or culture results may not always    correspond due to difference in methodologies.    ************************************************************    This PCR assaywas performed using [employer].    The following targets are tested for: Enterococcus,    vancomycin resistant enterococci, Listeria monocytogenes,    coagulase negative staphylococci, S. aureus,    methicillin resistant S. aureus, Streptococcus agalactiae    (Group B), S. pneumoniae, S. pyogenes (Group A),    Acinetobacter baumannii, Enterobacter cloacae, E. coli,    Klebsiella oxytoca, K. pneumoniae, Proteus sp.,    Serratia marcescens, Haemophilus influenzae,    Neisseria meningitidis, Pseudomonas aeruginosa, Candida    albicans, C. glabrata, C krusei, C parapsilosis,    C. tropicalis and the KPC resistance gene.  Organism: Blood Culture PCR (2019 11:10)  Organism: Blood Culture PCR (2019 11:10)    Culture - Blood (collected 2019 16:00)  Source: .Blood Blood  Preliminary Report (2019 01:01):    No growth to date.    Culture - Urine (collected 2019 13:35)  Source: .Urine Clean Catch (Midstream)  Final Report (2019 21:09):    No growth      CARDIAC MARKERS ( 2019 12:09 )  x     / 0.04 ng/mL / x     / x     / x      CARDIAC MARKERS ( 2019 18:22 )  x     / 0.03 ng/mL / x     / x     / x      CARDIAC MARKERS ( 2019 14:20 )  x     / <0.01 ng/mL / x     / x     / x          RADIOLOGY:  < from: VA Duplex Upper Ext Vein Scan, Right (19 @ 08:46) >    Impression:    No evidence of deep or superficial venous thrombosis in the right upper   extremity.    < end of copied text >    < from: US Abdomen Limited (19 @ 08:45) >    IMPRESSION:    Gallbladder stones and sludge, without sonographic evidence of acute   cholecystitis.    < end of copied text >    PHYSICAL EXAM:    GENERAL: NAD, AAOx3  HEENT: EOMI, conjunctiva and sclera clear, No JVD  PULMONARY: Clear to auscultation bilaterally; No wheeze  CARDIOVASCULAR: Regular rate and rhythm; No murmurs, rubs, or gallops  GASTROINTESTINAL: Soft, Nontender, Nondistended  MUSCULOSKELETAL:  2+ Peripheral Pulses  NEUROLOGY: non-focal  SKIN: No rashes       ADMISSION SUMMARY  Patient is a 78y old Female who presents with a chief complaint of Failure to thrive (2019 13:02)  Currently admitted to medicine with the primary diagnosis of Sepsis      ASSESSMENT AND PLAN   79y/o w/ past medical history of hypertension (not on any meds), COPD (not on any meds), and schizophrenia presenting w/ chief complaint of decreased PO intake and inability to ambulate found to have ROSSY and mild troponemia.      #Sepsis POA (leukocytosis, tachycardia) 2/2 Polymicrobial bacteremia   - restarted zosyn in light of gram + rods bacteremia as well   - repeat blood cultures, also positive for gram + rods   - ID consult appreciated   - awaiting sensitivities     #Acute kidney injury, resolved   - likely pre-renal due to poor PO intake   - Cr 1.6 on admission (baseline normal 1.1 in 2018), currently 1.1   - d/c fluids  -Trend BMP and send urine studies if no improvement  -No evidence of hydronephrosis on CT A/P    #Transaminitis  - AST/ALT: 107/133.  Bilirubin 1.4. Mixed hepatocellular/cholestatic pattern (downtrending liver enzymes)   - likely due to cholestatic liver injury due to sepsis   - CT A/P w/ contrast negative  - US RUQ ordered- gallbladder stones & sludge w/o sonographic evidence of acute cholecystitis     #Mild Troponemia  - Trops 0.01 -> 0.03 --> 0.04  - Pt denies chest pain, sob, palpitations, nausea or vomiting. EKG negative for ischemic changes  -f/u CK-MB at 11 AM, 3.7   - TTE ordered     #Failure to thrive  - Decreased PO intake, decreased ambulation, dehydration  - Calorie count and dietary eval requested  - Dash diet with 1:1 PO feeds  - PT/Rehab eval requested  - Daughter will prefer pt to go back home on discharge, not SNF.      #Abdominal wall hernia with loop of non-obstructed large bowel  -Incidental finding on CT scan  - Pt seen by surgery, f/u w/ Matt Viveros as outpt     #Schizophrenia  - C/w Risperidone 4mg q12hr    #HTN   -not on home meds  -taking nifedipine here     #COPD  -not on home meds   -controlled for now, will continue to monitor     Diet: DASH  DVT PPX: Heparin SQ  Activity: out of bed to chair   Dispo: will discuss with daughter   Code Status: Full

## 2019-07-09 NOTE — PATIENT PROFILE ADULT - OVER THE PAST TWO WEEKS HAVE YOU FELT DOWN, DEPRESSED OR HOPELESS?
patient is confused, unable to assess patient is confused, unable to assess depression and violence/abuse screen

## 2019-07-09 NOTE — PHYSICAL THERAPY INITIAL EVALUATION ADULT - PERTINENT HX OF CURRENT PROBLEM, REHAB EVAL
77y/o w/ past medical history of hypertension (not on any meds), COPD (not on any meds), and schizophrenia presenting w/ chief complaint of decreased PO intake and inability to ambulate

## 2019-07-09 NOTE — PHYSICAL THERAPY INITIAL EVALUATION ADULT - ADDITIONAL COMMENTS
pt reports she used a RW PTA but was an indoor ambulator only. daughter lives with her and helps  8 [address]

## 2019-07-09 NOTE — CONSULT NOTE ADULT - SUBJECTIVE AND OBJECTIVE BOX
KARRIE CARLISLE  78y, Female  Allergy: No Known Allergies      CHIEF COMPLAINT: Failure to thrive (2019 09:52)      HPI:  History is provided by daughter: Zoë: [phone]    79y/o w/ past medical history of hypertension (not on any meds), COPD (not on any meds), and schizophrenia presenting w/ chief complaint of decreased PO intake and inability to ambulate.    As per daughter, pt is minimally-verbal, but AAOx3 at baseline. Over the last week, she has been having generalized weakness, and  significantly reduced PO intake which is unusual for her. The pt's mental status has slightly worsened, and she is not responding to her daughter's questions, often "saying no to everything". She has also not been able to ambulate from her bed to the bathroom, and had a few episodes of urinary incontinence. Pt lives with her daughter at home, and usually ambulates with a walker. She denies any fevers, chills, chest pain, palpitations, shortness of breath, abdominal pain, dysuria, frequency or urgency at home.   Daughter says the pt has not followed up with a PMD in a few years as her PMD is not "out of network" and she has not been able to find a PMD in [place] yet.    In the ED, VS: /74  RR 20 T 99.1 satting 96% on RA. She received Rocephin 1g, 2L NS 0.9%.   She had a CTH that was negative for acute intracranial pathology, and a CT A/P that showed Anterior abdominal wall hernia containing a loop of non-obstructed bowel. (2019 04:00)    PAST MEDICAL & SURGICAL HISTORY:  COPD (chronic obstructive pulmonary disease)  Hypertension  Schizo affective schizophrenia  H/O total hysterectomy      FAMILY HISTORY  FH: hypertension  No pertinent family history in first degree relatives      SOCIAL HISTORY  Social History (marital status, living situation, occupation, tobacco use, alcohol and drug use, and sexual history): Lives at home with daughter  	Ex-smoker  	No alcohol use  No illicit drug use      ROS  General: Denies rigors, nightsweats  HEENT: Denies headache, rhinorrhea, sore throat, eye pain  CV: Denies CP, palpitations  PULM: Denies SOB, wheezing  GI: Denies abdominal pain, hematochezia/melena  : Denies dysuria, hematuria  MSK: Denies arthralgias, myalgias  SKIN: Denies rash, lesions  NEURO: Denies paresthesias, weakness  PSYCH: Denies depression, anxiety    VITALS:  T(F): 96.1, Max: 98.7 (19 @ 19:45)  HR: 95  BP: 144/70  RR: 18Vital Signs Last 24 Hrs  T(C): 35.6 (2019 04:54), Max: 37.1 (2019 19:45)  T(F): 96.1 (2019 04:54), Max: 98.7 (2019 19:45)  HR: 95 (2019 04:54) (87 - 107)  BP: 144/70 (2019 04:54) (135/71 - 144/70)  BP(mean): --  RR: 18 (2019 04:54) (18 - 18)  SpO2: 92% (2019 15:40) (92% - 92%)    PHYSICAL EXAM:  Gen: Obese. NAD, resting in bed  HEENT: Normocephalic, atraumatic  Neck: supple, no lymphadenopathy  CV: Regular rate & regular rhythm  Lungs: decreased BS at bases, no fremitus  Abdomen: Soft, BS present  Ext: Warm, well perfused  Neuro: non focal, awake  Skin: no rash, no erythema  Lines: no phlebitis    TESTS & MEASUREMENTS:                        13.6   6.40  )-----------( 165      ( 2019 07:18 )             41.2         138  |  102  |  19  ----------------------------<  118<H>  3.4<L>   |  23  |  1.1    Ca    8.7      2019 07:18  Mg     1.9         TPro  6.5  /  Alb  3.2<L>  /  TBili  0.7  /  DBili  x   /  AST  33  /  ALT  58<H>  /  AlkPhos  94      eGFR if Non African American: 48 mL/min/1.73M2 (19 @ 07:18)  eGFR if African American: 56 mL/min/1.73M2 (19 @ 07:18)    LIVER FUNCTIONS - ( 2019 07:18 )  Alb: 3.2 g/dL / Pro: 6.5 g/dL / ALK PHOS: 94 U/L / ALT: 58 U/L / AST: 33 U/L / GGT: x           Urinalysis Basic - ( 2019 14:20 )    Color: Orange / Appearance: Cloudy / S.020 / pH: x  Gluc: x / Ketone: Trace  / Bili: Moderate / Urobili: 1.0 mg/dL   Blood: x / Protein: 30 mg/dL / Nitrite: Negative   Leuk Esterase: Small / RBC: x / WBC 3-5 /HPF   Sq Epi: x / Non Sq Epi: x / Bacteria: Few /HPF        Culture - Blood (collected 19 @ 16:10)  Source: .Blood Blood  Gram Stain (19 @ 10:13):    Growth in aerobic bottle: Gram Negative Rods    Growth in anaerobic bottle: Gram Negative Rods  Preliminary Report (19 @ 10:13):    Growth in aerobic bottle: Gram Negative Rods    Growth in anaerobic bottle: Gram Negative Rods    "Due to technical problems, Proteus sp. will Not be reported as part of    the BCID panel until further notice" ***Blood Panel PCR results on this    specimenare available    approximately 3 hours after the Gram stain result.***    Gram stain, PCR, and/or culture results may not always    correspond due to difference in methodologies.    ************************************************************    This PCR assaywas performed using [employer].    The following targets are tested for: Enterococcus,    vancomycin resistant enterococci, Listeria monocytogenes,    coagulase negative staphylococci, S. aureus,    methicillin resistant S. aureus, Streptococcus agalactiae    (Group B), S. pneumoniae, S. pyogenes (Group A),    Acinetobacter baumannii, Enterobacter cloacae, E. coli,    Klebsiella oxytoca, K. pneumoniae, Proteus sp.,    Serratia marcescens, Haemophilus influenzae,    Neisseria meningitidis, Pseudomonas aeruginosa, Candida    albicans, C. glabrata, C krusei, C parapsilosis,    C. tropicalis and the KPC resistance gene.  Organism: Blood Culture PCR (19 @ 11:10)  Organism: Blood Culture PCR (19 @ 11:10)      -  Escherichia coli: Detec      Method Type: PCR    Culture - Blood (collected 19 @ 16:00)  Source: .Blood Blood  Preliminary Report (19 @ 01:01):    No growth to date.    Culture - Urine (collected 19 @ 13:35)  Source: .Urine Clean Catch (Midstream)  Final Report (19 @ 21:09):    No growth        Blood Gas Venous - Lactate: 1.6 mmoL/L (19 @ 14:31)      INFECTIOUS DISEASES TESTING      RADIOLOGY & ADDITIONAL TESTS:  I have personally reviewed the last Chest xray  CXR  Xray Chest 1 View AP/PA:   EXAM:  XR CHEST FRONTAL 1V            PROCEDURE DATE:  2019            INTERPRETATION:  Clinical History / Reason for exam: Weakness    Comparison : Chest radiograph 2018.    Technique/Positioning: XR CHEST.    Findings:    Support devices: None.    Cardiac/mediastinum/hilum: Unremarkable.    Lung parenchyma/Pleura: No focal parenchymal opacities, pleural effusion   or pneumothorax are present.    Skeleton/soft tissues: Unremarkable.    Impression:      No radiographic evidence of acute pulmonary disease.                      SAÚL REYNOLDS M.D., ATTENDING RADIOLOGIST  This document has been electronically signed. 2019  2:56PM             (19 @ 14:49)      CT  CT Abdomen and Pelvis w/ IV Cont:   EXAM:  CT ABDOMEN AND PELVIS IC            PROCEDURE DATE:  2019            INTERPRETATION:  CLINICAL STATEMENT: Diffuse pain.      TECHNIQUE: Contiguous axial CT images were obtained from the lower chest   to the pubic symphysis following administration of 100cc Optiray 320   intravenous contrast.  Oral contrast was not administered.  Reformatted   images in the coronal and sagittal planes were acquired.    COMPARISON CT: None.      FINDINGS:    Streak artifact from bilateral arms degrades evaluation of the upper   abdomen    LOWER CHEST: Coronary artery calcification.    HEPATOBILIARY: Unremarkable.    SPLEEN: Unremarkable.    PANCREAS: Unremarkable.    ADRENAL GLANDS: Bilateral adrenal gland thickening/small nodules.    KIDNEYS: Contrast is seen within the bilateral collecting system,   limiting the evaluation of renal stones. No hydronephrosis.    ABDOMINOPELVIC NODES: No enlarged abdominal or pelvic lymph nodes.    PELVIC ORGANS: Contrast is seen within the lumen of the urinary bladder.   Hysterectomy    PERITONEUM/MESENTERY/BOWEL: No evidence of bowel obstruction, free air or   ascites. Normal appendix. Anterior abdominal wall hernia containing a   loop of nonobstructed bowel.    BONES/SOFT TISSUES: Multilevel degenerative changes of the spine. Diffuse   osteopenia.    OTHER: Atherosclerotic disease of the aorta and its branches.      IMPRESSION:     Contrast is seen within the bilateral collecting system, limiting the   evaluation of renal stones.    Anterior abdominal wall hernia containing a loop of nonobstructed bowel.    Additional findings after attending review: The right colon appears   thickened despite underdistention. Correlate for colitis.              SNEHA GONCALVES M.D., RESIDENT RADIOLOGIST  Thisdocument has been electronically signed.  NAALISA MCKEON M.D., ATTENDING RADIOLOGIST  This document has been electronically signed. 2019 10:56PM             (19 @ 21:27)      CARDIOLOGY TESTING  12 Lead ECG:   Ventricular Rate 86 BPM    Atrial Rate 86 BPM    P-R Interval 254 ms    QRS Duration 92 ms    Q-T Interval 388 ms    QTC Calculation(Bezet) 464 ms    R Axis -32 degrees    T Axis 31 degrees    Diagnosis Line Sinus rhythm with 1st degree A-V block  Left axis deviation  Voltage criteria for left ventricular hypertrophy  Cannot rule out Septal infarct , age undetermined  Abnormal ECG    Confirmed by Terri Brink MD ([id number]) on 2019 7:39:53 AM (19 @ 20:10)  12 Lead ECG:   Ventricular Rate 98 BPM    Atrial Rate 98 BPM    P-R Interval 198 ms    QRS Duration 94 ms    Q-T Interval 380 ms    QTC Calculation(Bezet) 485 ms    P Axis 55 degrees    R Axis -28 degrees    T Axis 49 degrees    Diagnosis Line Normal sinus rhythm  Voltage criteria for left ventricular hypertrophy  Cannot rule out Septal infarct , age undetermined  Abnormal ECG    Confirmed by Terri Brink MD ([id number]) on 2019 7:39:45 AM (19 @ 16:35)      MEDICATIONS  chlorhexidine 4% Liquid 1  heparin  Injectable 5000  meropenem  IVPB 1000  NIFEdipine XL 30  risperiDONE   Tablet 4      ANTIBIOTICS:  meropenem  IVPB 1000 milliGRAM(s) IV Intermittent every 8 hours      No Known Allergies

## 2019-07-10 LAB
-  AMIKACIN: SIGNIFICANT CHANGE UP
-  AMPICILLIN/SULBACTAM: SIGNIFICANT CHANGE UP
-  AMPICILLIN: SIGNIFICANT CHANGE UP
-  AZTREONAM: SIGNIFICANT CHANGE UP
-  CANDIDA ALBICANS: SIGNIFICANT CHANGE UP
-  CANDIDA GLABRATA: SIGNIFICANT CHANGE UP
-  CANDIDA KRUSEI: SIGNIFICANT CHANGE UP
-  CANDIDA PARAPSILOSIS: SIGNIFICANT CHANGE UP
-  CANDIDA TROPICALIS: SIGNIFICANT CHANGE UP
-  CEFAZOLIN: SIGNIFICANT CHANGE UP
-  CEFEPIME: SIGNIFICANT CHANGE UP
-  CEFOXITIN: SIGNIFICANT CHANGE UP
-  CEFTRIAXONE: SIGNIFICANT CHANGE UP
-  CIPROFLOXACIN: SIGNIFICANT CHANGE UP
-  COAGULASE NEGATIVE STAPHYLOCOCCUS: SIGNIFICANT CHANGE UP
-  ERTAPENEM: SIGNIFICANT CHANGE UP
-  GENTAMICIN: SIGNIFICANT CHANGE UP
-  IMIPENEM: SIGNIFICANT CHANGE UP
-  K. PNEUMONIAE GROUP: SIGNIFICANT CHANGE UP
-  KPC RESISTANCE GENE: SIGNIFICANT CHANGE UP
-  LEVOFLOXACIN: SIGNIFICANT CHANGE UP
-  MEROPENEM: SIGNIFICANT CHANGE UP
-  PIPERACILLIN/TAZOBACTAM: SIGNIFICANT CHANGE UP
-  STREPTOCOCCUS SP. (NOT GRP A, B OR S PNEUMONIAE): SIGNIFICANT CHANGE UP
-  TOBRAMYCIN: SIGNIFICANT CHANGE UP
-  TRIMETHOPRIM/SULFAMETHOXAZOLE: SIGNIFICANT CHANGE UP
A BAUMANNII DNA SPEC QL NAA+PROBE: SIGNIFICANT CHANGE UP
ALBUMIN SERPL ELPH-MCNC: 3.1 G/DL — LOW (ref 3.5–5.2)
ALP SERPL-CCNC: 82 U/L — SIGNIFICANT CHANGE UP (ref 30–115)
ALT FLD-CCNC: 38 U/L — SIGNIFICANT CHANGE UP (ref 0–41)
ANA TITR SER: NEGATIVE — SIGNIFICANT CHANGE UP
ANION GAP SERPL CALC-SCNC: 15 MMOL/L — HIGH (ref 7–14)
ANION GAP SERPL CALC-SCNC: 18 MMOL/L — HIGH (ref 7–14)
AST SERPL-CCNC: 28 U/L — SIGNIFICANT CHANGE UP (ref 0–41)
BASOPHILS # BLD AUTO: 0.07 K/UL — SIGNIFICANT CHANGE UP (ref 0–0.2)
BASOPHILS NFR BLD AUTO: 1.2 % — HIGH (ref 0–1)
BILIRUB SERPL-MCNC: 0.6 MG/DL — SIGNIFICANT CHANGE UP (ref 0.2–1.2)
BUN SERPL-MCNC: 20 MG/DL — SIGNIFICANT CHANGE UP (ref 10–20)
BUN SERPL-MCNC: 21 MG/DL — HIGH (ref 10–20)
CALCIUM SERPL-MCNC: 8.8 MG/DL — SIGNIFICANT CHANGE UP (ref 8.5–10.1)
CALCIUM SERPL-MCNC: 9.2 MG/DL — SIGNIFICANT CHANGE UP (ref 8.5–10.1)
CHLORIDE SERPL-SCNC: 100 MMOL/L — SIGNIFICANT CHANGE UP (ref 98–110)
CHLORIDE SERPL-SCNC: 102 MMOL/L — SIGNIFICANT CHANGE UP (ref 98–110)
CO2 SERPL-SCNC: 21 MMOL/L — SIGNIFICANT CHANGE UP (ref 17–32)
CO2 SERPL-SCNC: 23 MMOL/L — SIGNIFICANT CHANGE UP (ref 17–32)
CREAT SERPL-MCNC: 1.1 MG/DL — SIGNIFICANT CHANGE UP (ref 0.7–1.5)
CREAT SERPL-MCNC: 1.2 MG/DL — SIGNIFICANT CHANGE UP (ref 0.7–1.5)
CULTURE RESULTS: SIGNIFICANT CHANGE UP
E CLOAC COMP DNA BLD POS QL NAA+PROBE: SIGNIFICANT CHANGE UP
E COLI DNA BLD POS QL NAA+NON-PROBE: SIGNIFICANT CHANGE UP
ENTEROCOC DNA BLD POS QL NAA+NON-PROBE: SIGNIFICANT CHANGE UP
ENTEROCOC DNA BLD POS QL NAA+NON-PROBE: SIGNIFICANT CHANGE UP
EOSINOPHIL # BLD AUTO: 0.32 K/UL — SIGNIFICANT CHANGE UP (ref 0–0.7)
EOSINOPHIL NFR BLD AUTO: 5.6 % — SIGNIFICANT CHANGE UP (ref 0–8)
GLUCOSE SERPL-MCNC: 112 MG/DL — HIGH (ref 70–99)
GLUCOSE SERPL-MCNC: 218 MG/DL — HIGH (ref 70–99)
GP B STREP DNA BLD POS QL NAA+NON-PROBE: SIGNIFICANT CHANGE UP
GRAM STN FLD: SIGNIFICANT CHANGE UP
HAEM INFLU DNA BLD POS QL NAA+NON-PROBE: SIGNIFICANT CHANGE UP
HCT VFR BLD CALC: 39.3 % — SIGNIFICANT CHANGE UP (ref 37–47)
HCT VFR BLD CALC: 41.8 % — SIGNIFICANT CHANGE UP (ref 37–47)
HGB BLD-MCNC: 12.8 G/DL — SIGNIFICANT CHANGE UP (ref 12–16)
HGB BLD-MCNC: 13.6 G/DL — SIGNIFICANT CHANGE UP (ref 12–16)
IMM GRANULOCYTES NFR BLD AUTO: 1.4 % — HIGH (ref 0.1–0.3)
K OXYTOCA DNA BLD POS QL NAA+NON-PROBE: SIGNIFICANT CHANGE UP
L MONOCYTOG DNA BLD POS QL NAA+NON-PROBE: SIGNIFICANT CHANGE UP
LYMPHOCYTES # BLD AUTO: 0.58 K/UL — LOW (ref 1.2–3.4)
LYMPHOCYTES # BLD AUTO: 10.1 % — LOW (ref 20.5–51.1)
MAGNESIUM SERPL-MCNC: 1.9 MG/DL — SIGNIFICANT CHANGE UP (ref 1.8–2.4)
MAGNESIUM SERPL-MCNC: 2 MG/DL — SIGNIFICANT CHANGE UP (ref 1.8–2.4)
MCHC RBC-ENTMCNC: 30.2 PG — SIGNIFICANT CHANGE UP (ref 27–31)
MCHC RBC-ENTMCNC: 30.3 PG — SIGNIFICANT CHANGE UP (ref 27–31)
MCHC RBC-ENTMCNC: 32.5 G/DL — SIGNIFICANT CHANGE UP (ref 32–37)
MCHC RBC-ENTMCNC: 32.6 G/DL — SIGNIFICANT CHANGE UP (ref 32–37)
MCV RBC AUTO: 92.7 FL — SIGNIFICANT CHANGE UP (ref 81–99)
MCV RBC AUTO: 92.9 FL — SIGNIFICANT CHANGE UP (ref 81–99)
METHOD TYPE: SIGNIFICANT CHANGE UP
METHOD TYPE: SIGNIFICANT CHANGE UP
MONOCYTES # BLD AUTO: 0.42 K/UL — SIGNIFICANT CHANGE UP (ref 0.1–0.6)
MONOCYTES NFR BLD AUTO: 7.3 % — SIGNIFICANT CHANGE UP (ref 1.7–9.3)
MRSA SPEC QL CULT: SIGNIFICANT CHANGE UP
MSSA DNA SPEC QL NAA+PROBE: SIGNIFICANT CHANGE UP
N MEN ISLT CULT: SIGNIFICANT CHANGE UP
NEUTROPHILS # BLD AUTO: 4.26 K/UL — SIGNIFICANT CHANGE UP (ref 1.4–6.5)
NEUTROPHILS NFR BLD AUTO: 74.4 % — SIGNIFICANT CHANGE UP (ref 42.2–75.2)
NRBC # BLD: 0 /100 WBCS — SIGNIFICANT CHANGE UP (ref 0–0)
NRBC # BLD: 0 /100 WBCS — SIGNIFICANT CHANGE UP (ref 0–0)
ORGANISM # SPEC MICROSCOPIC CNT: SIGNIFICANT CHANGE UP
P AERUGINOSA DNA BLD POS NAA+NON-PROBE: SIGNIFICANT CHANGE UP
PLATELET # BLD AUTO: 190 K/UL — SIGNIFICANT CHANGE UP (ref 130–400)
PLATELET # BLD AUTO: 194 K/UL — SIGNIFICANT CHANGE UP (ref 130–400)
POTASSIUM SERPL-MCNC: 3.1 MMOL/L — LOW (ref 3.5–5)
POTASSIUM SERPL-MCNC: 3.5 MMOL/L — SIGNIFICANT CHANGE UP (ref 3.5–5)
POTASSIUM SERPL-SCNC: 3.1 MMOL/L — LOW (ref 3.5–5)
POTASSIUM SERPL-SCNC: 3.5 MMOL/L — SIGNIFICANT CHANGE UP (ref 3.5–5)
PROT SERPL-MCNC: 6.4 G/DL — SIGNIFICANT CHANGE UP (ref 6–8)
RBC # BLD: 4.23 M/UL — SIGNIFICANT CHANGE UP (ref 4.2–5.4)
RBC # BLD: 4.51 M/UL — SIGNIFICANT CHANGE UP (ref 4.2–5.4)
RBC # FLD: 14.9 % — HIGH (ref 11.5–14.5)
RBC # FLD: 15 % — HIGH (ref 11.5–14.5)
S MARCESCENS DNA BLD POS NAA+NON-PROBE: SIGNIFICANT CHANGE UP
S PNEUM DNA BLD POS QL NAA+NON-PROBE: SIGNIFICANT CHANGE UP
S PYO DNA BLD POS QL NAA+NON-PROBE: SIGNIFICANT CHANGE UP
SODIUM SERPL-SCNC: 139 MMOL/L — SIGNIFICANT CHANGE UP (ref 135–146)
SODIUM SERPL-SCNC: 140 MMOL/L — SIGNIFICANT CHANGE UP (ref 135–146)
SPECIMEN SOURCE: SIGNIFICANT CHANGE UP
WBC # BLD: 4.95 K/UL — SIGNIFICANT CHANGE UP (ref 4.8–10.8)
WBC # BLD: 5.73 K/UL — SIGNIFICANT CHANGE UP (ref 4.8–10.8)
WBC # FLD AUTO: 4.95 K/UL — SIGNIFICANT CHANGE UP (ref 4.8–10.8)
WBC # FLD AUTO: 5.73 K/UL — SIGNIFICANT CHANGE UP (ref 4.8–10.8)

## 2019-07-10 PROCEDURE — 99233 SBSQ HOSP IP/OBS HIGH 50: CPT

## 2019-07-10 RX ORDER — CEFTRIAXONE 500 MG/1
2000 INJECTION, POWDER, FOR SOLUTION INTRAMUSCULAR; INTRAVENOUS EVERY 24 HOURS
Refills: 0 | Status: DISCONTINUED | OUTPATIENT
Start: 2019-07-10 | End: 2019-07-12

## 2019-07-10 RX ORDER — METRONIDAZOLE 500 MG
500 TABLET ORAL
Refills: 0 | Status: DISCONTINUED | OUTPATIENT
Start: 2019-07-10 | End: 2019-07-15

## 2019-07-10 RX ADMIN — HEPARIN SODIUM 5000 UNIT(S): 5000 INJECTION INTRAVENOUS; SUBCUTANEOUS at 05:41

## 2019-07-10 RX ADMIN — HEPARIN SODIUM 5000 UNIT(S): 5000 INJECTION INTRAVENOUS; SUBCUTANEOUS at 21:22

## 2019-07-10 RX ADMIN — RISPERIDONE 4 MILLIGRAM(S): 4 TABLET ORAL at 05:41

## 2019-07-10 RX ADMIN — PIPERACILLIN AND TAZOBACTAM 25 GRAM(S): 4; .5 INJECTION, POWDER, LYOPHILIZED, FOR SOLUTION INTRAVENOUS at 13:08

## 2019-07-10 RX ADMIN — CEFTRIAXONE 100 MILLIGRAM(S): 500 INJECTION, POWDER, FOR SOLUTION INTRAMUSCULAR; INTRAVENOUS at 17:23

## 2019-07-10 RX ADMIN — PIPERACILLIN AND TAZOBACTAM 25 GRAM(S): 4; .5 INJECTION, POWDER, LYOPHILIZED, FOR SOLUTION INTRAVENOUS at 05:41

## 2019-07-10 RX ADMIN — Medication 30 MILLIGRAM(S): at 05:41

## 2019-07-10 RX ADMIN — RISPERIDONE 4 MILLIGRAM(S): 4 TABLET ORAL at 17:24

## 2019-07-10 RX ADMIN — Medication 500 MILLIGRAM(S): at 17:24

## 2019-07-10 RX ADMIN — HEPARIN SODIUM 5000 UNIT(S): 5000 INJECTION INTRAVENOUS; SUBCUTANEOUS at 13:08

## 2019-07-10 RX ADMIN — CHLORHEXIDINE GLUCONATE 1 APPLICATION(S): 213 SOLUTION TOPICAL at 05:41

## 2019-07-10 NOTE — PROGRESS NOTE ADULT - SUBJECTIVE AND OBJECTIVE BOX
KARRIE CARLISLE 78y Female  MRN#: [id number]   CODE STATUS: FULL       SUBJECTIVE  Patient is a 78y old Female who presents with a chief complaint of Failure to thrive (10 Jul 2019 09:32)  Currently admitted to medicine with the primary diagnosis of bacteremia     Today is hospital day 2d, and this morning she is doing well, no pain. No overnight events.       OBJECTIVE  PAST MEDICAL & SURGICAL HISTORY  COPD (chronic obstructive pulmonary disease)  Hypertension  Schizo affective schizophrenia  H/O total hysterectomy    ALLERGIES:  No Known Allergies    MEDICATIONS:  STANDING MEDICATIONS  cefTRIAXone   IVPB 2000 milliGRAM(s) IV Intermittent every 24 hours  chlorhexidine 4% Liquid 1 Application(s) Topical <User Schedule>  heparin  Injectable 5000 Unit(s) SubCutaneous every 8 hours  metroNIDAZOLE    Tablet 500 milliGRAM(s) Oral two times a day  NIFEdipine XL 30 milliGRAM(s) Oral daily  risperiDONE   Tablet 4 milliGRAM(s) Oral two times a day    PRN MEDICATIONS      VITAL SIGNS: Last 24 Hours  T(C): 35.4 (10 Jul 2019 12:06), Max: 36.9 (09 Jul 2019 20:18)  T(F): 95.8 (10 Jul 2019 12:06), Max: 98.4 (09 Jul 2019 20:18)  HR: 78 (10 Jul 2019 12:06) (78 - 95)  BP: 132/75 (10 Jul 2019 12:06) (123/63 - 158/78)  BP(mean): --  RR: 18 (10 Jul 2019 12:06) (18 - 18)  SpO2: --    LABS:                        13.6   4.95  )-----------( 194      ( 10 Jul 2019 12:45 )             41.8     07-10    140  |  102  |  20  ----------------------------<  112<H>  3.1<L>   |  23  |  1.1    Ca    8.8      10 Jul 2019 07:15  Mg     1.9     07-10    TPro  6.4  /  Alb  3.1<L>  /  TBili  0.6  /  DBili  x   /  AST  28  /  ALT  38  /  AlkPhos  82  07-10              Culture - Blood (collected 07 Jul 2019 16:10)  Source: .Blood Blood  Gram Stain (08 Jul 2019 10:13):    Growth in aerobic bottle: Gram Negative Rods    Growth in anaerobic bottle: Gram Negative Rods  Final Report (10 Jul 2019 08:24):    Growth in aerobic and anaerobic bottles: Escherichia coli    "Due to technical problems, Proteus sp. will Not be reported as part of    the BCID panel until further notice" ***Blood Panel PCR results on this    specimen are available    approximately 3 hours after the Gram stain result.***    Gram stain, PCR, and/or culture results may not always    correspond due to difference in methodologies.    ************************************************************    This PCR assay was performed using [employer].    The following targets are tested for: Enterococcus,    vancomycin resistant enterococci, Listeria monocytogenes,    coagulase negative staphylococci, S. aureus,    methicillin resistant S. aureus, Streptococcus agalactiae    (Group B), S. pneumoniae, S.pyogenes (Group A),    Acinetobacter baumannii, Enterobacter cloacae, E. coli,    Klebsiella oxytoca, K. pneumoniae, Proteus sp.,    Serratia marcescens, Haemophilus influenzae,    Neisseria meningitidis, Pseudomonas aeruginosa, Candida    albicans, C. glabrata, C krusei, C parapsilosis,    C. tropicalis and the KPC resistance gene.  Organism: Blood Culture PCR  Escherichia coli (10 Jul 2019 08:24)  Organism: Escherichia coli (10 Jul 2019 08:24)  Organism: Blood Culture PCR (10 Jul 2019 08:24)    Culture - Blood (collected 07 Jul 2019 16:00)  Source: .Blood Blood  Gram Stain (09 Jul 2019 14:58):    Growth in aerobic bottle: Gram Positive Rods  Preliminary Report (09 Jul 2019 14:58):    Growth in aerobic bottle: Gram Positive Rods    Culture - Urine (collected 07 Jul 2019 13:35)  Source: .Urine Clean Catch (Midstream)  Final Report (08 Jul 2019 21:09):    No growth      CARDIAC MARKERS ( 09 Jul 2019 12:02 )  x     / 0.07 ng/mL / x     / x     / 3.7 ng/mL      RADIOLOGY:  < from: [facility] Duplex Upper Ext Vein Scan, Right (07.08.19 @ 08:46) >    Impression:    No evidence of deep or superficial venous thrombosis in the right upper   extremity.    < end of copied text >    PHYSICAL EXAM:    GENERAL: NAD, well-developed, AAOx3  HEENT:  EOMI, conjunctiva and sclera clear,  PULMONARY: Clear to auscultation bilaterally; No wheeze  CARDIOVASCULAR: Regular rate and rhythm; No murmurs, rubs, or gallops  GASTROINTESTINAL: Soft, Nontender, Nondistended  MUSCULOSKELETAL:  2+ Peripheral Pulses  NEUROLOGY: non-focal  SKIN: No rashes      ADMISSION SUMMARY  Patient is a 78y old Female who presents with a chief complaint of Failure to thrive (10 Jul 2019 09:32)  Currently admitted to medicine with the primary diagnosis of bacteremia       ASSESSMENT & PLAN  77y/o w/ past medical history of hypertension (not on any meds), COPD (not on any meds), and schizophrenia presenting w/ chief complaint of decreased PO intake and inability to ambulate found to have ROSSY and mild troponemia.      #Sepsis POA (leukocytosis, tachycardia) 2/2 Polymicrobial bacteremia   - + for E. Coli & gram + rods   - d/c zosyn, on ceftriaxone & PO flagyl   - f/u repeat blood cultures  - ID consult appreciated-  If repeat bcx NG, on d/c plan for PO Cipro 500mg BID and Flagyl 500mg BID to complete 7 days   - awaiting sensitivities     #Acute kidney injury, resolved   - likely pre-renal due to poor PO intake   - Cr 1.6 on admission (baseline normal 1.1 in 2018), currently 1.1   - d/c fluids  -Trend BMP and send urine studies if no improvement  -No evidence of hydronephrosis on CT A/P    #Transaminitis, resolved   - AST/ALT: 28/38. ALP 82 now Bilirubin 0.6.  (downtrending liver enzymes)   - likely due to cholestatic liver injury due to sepsis   - CT A/P w/ contrast negative  - US RUQ ordered- gallbladder stones & sludge w/o sonographic evidence of acute cholecystitis, no acute intervention     #Mild Troponemia  - Trops 0.01 -> 0.03 --> 0.04 --> 0.7  - Pt denies chest pain, sob, palpitations, nausea or vomiting. EKG negative for ischemic changes  -f/u CK-MB, 3.7   - TTE ordered- positive for pulmonary HTN (pt has COPD, not on home meds)     #Failure to thrive  - Decreased PO intake, decreased ambulation, dehydration  - Calorie count and dietary eval requested  - Dash diet with 1:1 PO feeds  - PT/Rehab eval requested  - Daughter will prefer pt to go back home on discharge, not SNF with home help  - case management aware     #Abdominal wall hernia with loop of non-obstructed large bowel  -Incidental finding on CT scan  - Pt seen by surgery, f/u w/ Dr. Maynard, Matt as outpt     #Schizophrenia  - C/w Risperidone 4mg q12hr    #HTN   -not on home meds  -taking nifedipine here     #COPD  -not on home meds   -controlled for now, will continue to monitor     Diet: DASH  DVT PPX: Heparin SQ  Activity: out of bed to chair   Dispo: will discuss with daughter, need PMD, need to find out what pharmacy to send meds to    Code Status: Full

## 2019-07-10 NOTE — PROGRESS NOTE ADULT - SUBJECTIVE AND OBJECTIVE BOX
KARRIE CARLISLE  78y, Female  Allergy: No Known Allergies      CHIEF COMPLAINT: Failure to thrive (2019 12:43)      INTERVAL EVENTS/HPI  - No acute events overnight  - T(F): , Max: 98.4 (19 @ 20:18)  - Denies any worsening symptoms  - Tolerating medication  - WBC Count: 5.73 K/uL (07-10-19 @ 07:15)      ROS  General: Denies rigors, nightsweats  HEENT: Denies headache, rhinorrhea, sore throat, eye pain  CV: Denies CP, palpitations  PULM: Denies SOB, wheezing  GI: Denies abdominal pain, hematochezia/melena  : Denies dysuria, hematuria  MSK: Denies arthralgias, myalgias  SKIN: Denies rash, lesions  NEURO: Denies paresthesias, weakness  PSYCH: Denies depression, anxiety    VITALS:  T(F): 97.8, Max: 98.4 (19 @ 20:18)  HR: 86  BP: 158/78  RR: 18Vital Signs Last 24 Hrs  T(C): 36.6 (10 Jul 2019 04:46), Max: 36.9 (2019 20:18)  T(F): 97.8 (10 Jul 2019 04:46), Max: 98.4 (2019 20:18)  HR: 86 (10 Jul 2019 04:46) (86 - 95)  BP: 158/78 (10 Jul 2019 04:46) (123/63 - 158/78)  BP(mean): --  RR: 18 (10 Jul 2019 04:46) (18 - 18)  SpO2: --    PHYSICAL EXAM:  Gen: Obese. NAD, resting in bed  HEENT: Normocephalic, atraumatic  Neck: supple, no lymphadenopathy  CV: Regular rate & regular rhythm  Lungs: decreased BS at bases, no fremitus  Abdomen: Soft, BS present  Ext: Warm, well perfused  Neuro: non focal, awake  Skin: no rash, no erythema  Lines: no phlebitis      FH: Non-contributory  Social Hx: Non-contributory    TESTS & MEASUREMENTS:                        12.8   5.73  )-----------( 190      ( 10 Jul 2019 07:15 )             39.3     07-10    140  |  102  |  20  ----------------------------<  112<H>  3.1<L>   |  23  |  1.1    Ca    8.8      10 Jul 2019 07:15  Mg     1.9     07-10    TPro  6.4  /  Alb  3.1<L>  /  TBili  0.6  /  DBili  x   /  AST  28  /  ALT  38  /  AlkPhos  82  07-10    eGFR if Non African American: 48 mL/min/1.73M2 (07-10-19 @ 07:15)  eGFR if African American: 56 mL/min/1.73M2 (07-10-19 @ 07:15)    LIVER FUNCTIONS - ( 10 Jul 2019 07:15 )  Alb: 3.1 g/dL / Pro: 6.4 g/dL / ALK PHOS: 82 U/L / ALT: 38 U/L / AST: 28 U/L / GGT: x               Culture - Blood (collected 19 @ 16:10)  Source: .Blood Blood  Gram Stain (19 @ 10:13):    Growth in aerobic bottle: Gram Negative Rods    Growth in anaerobic bottle: Gram Negative Rods  Final Report (07-10-19 @ 08:24):    Growth in aerobic and anaerobic bottles: Escherichia coli    "Due to technical problems, Proteus sp. will Not be reported as part of    the BCID panel until further notice" ***Blood Panel PCR results on this    specimen are available    approximately 3 hours after the Gram stain result.***    Gram stain, PCR, and/or culture results may not always    correspond due to difference in methodologies.    ************************************************************    This PCR assay was performed using [employer].    The following targets are tested for: Enterococcus,    vancomycin resistant enterococci, Listeria monocytogenes,    coagulase negative staphylococci, S. aureus,    methicillin resistant S. aureus, Streptococcus agalactiae    (Group B), S. pneumoniae, S.pyogenes (Group A),    Acinetobacter baumannii, Enterobacter cloacae, E. coli,    Klebsiella oxytoca, K. pneumoniae, Proteus sp.,    Serratia marcescens, Haemophilus influenzae,    Neisseria meningitidis, Pseudomonas aeruginosa, Candida    albicans, C. glabrata, C krusei, C parapsilosis,    C. tropicalis and the KPC resistance gene.  Organism: Blood Culture PCR  Escherichia coli (07-10-19 @ 08:24)  Organism: Escherichia coli (07-10-19 @ 08:24)      -  Amikacin: S <=8      -  Ampicillin: R >16 These ampicillin results predict results for amoxicillin      -  Ampicillin/Sulbactam: I 16/8 Enterobacter, Citrobacter, and Serratia may develop resistance during prolonged therapy (3-4 days)      -  Aztreonam: S <=4      -  Cefazolin: S <=2 Enterobacter, Citrobacter, and Serratia may develop resistance during prolonged therapy (3-4 days)      -  Cefepime: S <=2      -  Cefoxitin: S <=4      -  Ceftriaxone: S <=1 Enterobacter, Citrobacter, and Serratia may develop resistance during prolonged therapy      -  Ciprofloxacin: S <=0.5      -  Ertapenem: S <=0.5      -  Gentamicin: R >8      -  Imipenem: S <=1      -  Levofloxacin: S <=1      -  Meropenem: S <=1      -  Piperacillin/Tazobactam: S <=8      -  Tobramycin: I 8      -  Trimethoprim/Sulfamethoxazole: R >2/38      Method Type: ABIOLA  Organism: Blood Culture PCR (07-10-19 @ 08:24)      -  Escherichia coli: Detec      Method Type: PCR    Culture - Blood (collected 19 @ 16:00)  Source: .Blood Blood  Gram Stain (19 @ 14:58):    Growth in aerobic bottle: Gram Positive Rods  Preliminary Report (19 @ 14:58):    Growth in aerobic bottle: Gram Positive Rods    Culture - Urine (collected 19 @ 13:35)  Source: .Urine Clean Catch (Midstream)  Final Report (19 @ 21:09):    No growth        Blood Gas Venous - Lactate: 1.6 mmoL/L (19 @ 14:31)      INFECTIOUS DISEASES TESTING      RADIOLOGY & ADDITIONAL TESTS:  I have personally reviewed the last Chest xray  CXR  Xray Chest 1 View AP/PA:   EXAM:  XR CHEST FRONTAL 1V            PROCEDURE DATE:  2019            INTERPRETATION:  Clinical History / Reason for exam: Weakness    Comparison : Chest radiograph 2018.    Technique/Positioning: XR CHEST.    Findings:    Support devices: None.    Cardiac/mediastinum/hilum: Unremarkable.    Lung parenchyma/Pleura: No focal parenchymal opacities, pleural effusion   or pneumothorax are present.    Skeleton/soft tissues: Unremarkable.    Impression:      No radiographic evidence of acute pulmonary disease.                      SAÚL REYNOLDS M.D., ATTENDING RADIOLOGIST  This document has been electronically signed. 2019  2:56PM             (19 @ 14:49)      CT  CT Abdomen and Pelvis w/ IV Cont:   EXAM:  CT ABDOMEN AND PELVIS IC            PROCEDURE DATE:  2019            INTERPRETATION:  CLINICAL STATEMENT: Diffuse pain.      TECHNIQUE: Contiguous axial CT images were obtained from the lower chest   to the pubic symphysis following administration of 100cc Optiray 320   intravenous contrast.  Oral contrast was not administered.  Reformatted   images in the coronal and sagittal planes were acquired.    COMPARISON CT: None.      FINDINGS:    Streak artifact from bilateral arms degrades evaluation of the upper   abdomen    LOWER CHEST: Coronary artery calcification.    HEPATOBILIARY: Unremarkable.    SPLEEN: Unremarkable.    PANCREAS: Unremarkable.    ADRENAL GLANDS: Bilateral adrenal gland thickening/small nodules.    KIDNEYS: Contrast is seen within the bilateral collecting system,   limiting the evaluation of renal stones. No hydronephrosis.    ABDOMINOPELVIC NODES: No enlarged abdominal or pelvic lymph nodes.    PELVIC ORGANS: Contrast is seen within the lumen of the urinary bladder.   Hysterectomy    PERITONEUM/MESENTERY/BOWEL: No evidence of bowel obstruction, free air or   ascites. Normal appendix. Anterior abdominal wall hernia containing a   loop of nonobstructed bowel.    BONES/SOFT TISSUES: Multilevel degenerative changes of the spine. Diffuse   osteopenia.    OTHER: Atherosclerotic disease of the aorta and its branches.      IMPRESSION:     Contrast is seen within the bilateral collecting system, limiting the   evaluation of renal stones.    Anterior abdominal wall hernia containing a loop of nonobstructed bowel.    Additional findings after attending review: The right colon appears   thickened despite underdistention. Correlate for colitis.              SNEHA GONCALVES M.D., RESIDENT RADIOLOGIST  Thisdocument has been electronically signed.  ANALISA MCKEON M.D., ATTENDING RADIOLOGIST  This document has been electronically signed. 2019 10:56PM             (19 @ 21:27)      CARDIOLOGY TESTING  Transthoracic Echocardiogram:    EXAM:  2-D ECHO (TTE) COMPLETE        PROCEDURE DATE:  2019      INTERPRETATION:  REPORT:    TRANSTHORACIC ECHOCARDIOGRAM REPORT         Patient Name:   KARRIE CARLISLE Accession #: [id number]  Medical Rec #:  [id number]              Height:      62.0 in 157.5 cm  YOB: 1940              Weight:      194.0 lb 88.00 kg  Patient Age:    78 years               BSA:         1.89 m²  Patient Gender: F                      BP:          123/63 mmHg       Date of Exam:        2019 2:55:25 PM  Referring Physician: [id number] ED UNASSIGNED  Sonographer:         Tiffani Jeffers  Reading Physician:   Jamil Tejeda MD.    Procedure:     2D Echo/Doppler/Color Doppler Complete.  Indications:   R01.1 - Cardiac Murmur, unspecified  Diagnosis:     Cardiac murmur, unspecified - R01.1  Study Details: Technically difficult study. Study quality was adversely   affected                 due to body habitus and the patient being uncooperative.         Summary:   1. LV Ejection Fraction by Limon's Method with a biplane EF of 66 %.   2. Elevated left atrial and left ventricular end-diastolic pressures.   3. Mild concentric left ventricular hypertrophy.   4. Mildly increased LV wall thickness.   5. Normal left ventricular internal cavity size.   6. Spectral Doppler shows impaired relaxation pattern of left   ventricular myocardial filling (Grade I diastolic dysfunction).   7. Estimated pulmonary artery systolic pressure is 44.1 mmHg assuming a   right atrial pressure of 8 mmHg,which is consistent with mild pulmonary   hypertension.   8. Pulmonary hypertension is present.   9. LA volume Index is 33.6 ml/m² ml/m2.    PHYSICIAN INTERPRETATION:  Left Ventricle: The left ventricular internal cavity size is normal. Left   ventricular wall thickness is mildly increased. There is mild concentric   left ventricular hypertrophy. Normal segmental left ventricular systolic   function. Spectral Doppler shows impaired relaxation pattern of left   ventricular myocardial filling (Grade I diastolic dysfunction). Elevated   left atrial and left ventricular end-diastolic pressures.  Right Ventricle: The right ventricular size is mildly enlarged. RV wall   thickness is normal. RV systolic function is mildly reduced.  Left Atrium: Mildly enlarged left atrium. LA volume Index is 33.6 ml/m²   ml/m2.  Right Atrium: Normal right atrial size.  Pericardium: There is no evidence of pericardial effusion.  Mitral Valve: Structurally normal mitral valve, with normal leaflet   excursion. The mitral valve is normal in structure. No evidence of mitral   valve regurgitation is seen.  Tricuspid Valve: Structurally normal tricuspid valve, with normal leaflet   excursion. The tricuspid valve is normal in structure. Mild tricuspid   regurgitation is visualized. Estimated pulmonary artery systolic pressure   is 44.1 mmHg assuming a right atrial pressure of 8 mmHg, which is   consistent with mild pulmonary hypertension.  Aortic Valve: Normal trileaflet aortic valve with normal opening. The   aortic valve is normal. Peak transaortic gradient equals 15.7 mmHg, mean   transaortic gradient equals 9.6 mmHg, the calculated aortic valve area   equals 2.51 cm² by the continuity equation consistent with normally   opening aortic valve. No evidence of aortic valve regurgitation is seen.  Pulmonic Valve: Structurally normal pulmonic valve, with normal leaflet   excursion. The pulmonic valve is normal. No indication of pulmonic valve   regurgitation.  Aorta: The aortic root and ascending aorta arestructurally normal, with   no evidence of dilitation.  Pulmonary Artery: The main pulmonary artery is normal in size. Pulmonary   hypertension is present.  Venous: The inferior vena cava was dilated, with respiratory size   variation greater than 50%.       2D AND M-MODE MEASUREMENTS (normal ranges within parentheses):  Left                  Normal   Aorta/Left             Normal  Ventricle:                     Atrium:  IVSd (2D):  1.17 cm  (0.7-1.1) AoV Cusp       1.51  (1.5-2.6)  LVPWd (2D): 1.20 cm  (0.7-1.1) Separation:     cm  LVIDd (2D): 4.41 cm  (3.4-5.7) Left Atrium    4.07  (1.9-4.0)  LVIDs (2D): 2.85 cm            (Mmode):        cm  LV FS (2D):  35.4 %   (>25%)   LA Volume      33.7  Relative      0.54    (<0.42)  Index         ml/m²  Wall                           Right  Thickness                      Ventricle:                                 RVd (2D):      3.73 cm                                 TAPSE:         1.60 cm    SPECTRAL DOPPLER ANALYSIS:  LV DIASTOLIC FUNCTION:  MV Peak E: 0.62 m/s Decel Time: 175 msec  MV Peak A: 1.22 m/s  E/A Ratio: 0.50    Aortic Valve:  AoV VMax:    1.98 m/s  AoV Area, Vmax: 2.16 cm² Vmax Indx: 1.14 cm²/m²  AoV VTI:     0.33 m    AoV Area, VTI:  2.51 cm² VTI Indx:  1.33 cm²/m²  AoV Pk Grad: 15.7 mmHg  AoV Mn Grad: 9.6 mmHg    LVOT Vmax: 1.42 m/s  LVOT VTI:  0.27 m  LVOT Diam: 1.96 cm    Mitral Valve:  MV P1/2 Time: 50.79 msec  MV Area, PHT: 4.33 cm²    Tricuspid Valve and PA/RV Systolic Pressure: TR Max Velocity: 3.00 m/s RA   Pressure: 8 mmHg RVSP/PASP: 44.1 mmHg    Pulmonic Valve:  PV Max Velocity: 1.01 m/s PV Max P.1 mmHg PV Mean PG:       [id number] Jamil Tejeda MD, Electronically signed on 2019 at 4:13:41   PM              *** Final ***                    JAMIL TEJEDA MD  This document has been electronically signed. 2019  2:55PM             (19 @ 14:55)  12 Lead ECG:   Ventricular Rate 86 BPM    Atrial Rate 86 BPM    P-R Interval 254 ms    QRS Duration 92 ms    Q-T Interval 388 ms    QTC Calculation(Bezet) 464 ms    R Axis -32 degrees    T Axis 31 degrees    Diagnosis Line Sinus rhythm with 1st degree A-V block  Left axis deviation  Voltage criteria for left ventricular hypertrophy  Cannot rule out Septal infarct , age undetermined  Abnormal ECG    Confirmed by Terri Brink MD ([id number]) on 2019 7:39:53 AM (19 @ 20:10)      MEDICATIONS  chlorhexidine 4% Liquid 1  heparin  Injectable 5000  NIFEdipine XL 30  piperacillin/tazobactam IVPB.. 3.375  risperiDONE   Tablet 4      ANTIBIOTICS:  piperacillin/tazobactam IVPB.. 3.375 Gram(s) IV Intermittent every 8 hours

## 2019-07-11 ENCOUNTER — TRANSCRIPTION ENCOUNTER (OUTPATIENT)
Age: 79
End: 2019-07-11

## 2019-07-11 LAB
ANION GAP SERPL CALC-SCNC: 12 MMOL/L — SIGNIFICANT CHANGE UP (ref 7–14)
BUN SERPL-MCNC: 18 MG/DL — SIGNIFICANT CHANGE UP (ref 10–20)
CALCIUM SERPL-MCNC: 9 MG/DL — SIGNIFICANT CHANGE UP (ref 8.5–10.1)
CHLORIDE SERPL-SCNC: 104 MMOL/L — SIGNIFICANT CHANGE UP (ref 98–110)
CO2 SERPL-SCNC: 25 MMOL/L — SIGNIFICANT CHANGE UP (ref 17–32)
CREAT SERPL-MCNC: 0.9 MG/DL — SIGNIFICANT CHANGE UP (ref 0.7–1.5)
CULTURE RESULTS: SIGNIFICANT CHANGE UP
GLUCOSE SERPL-MCNC: 106 MG/DL — HIGH (ref 70–99)
HCT VFR BLD CALC: 38.5 % — SIGNIFICANT CHANGE UP (ref 37–47)
HGB BLD-MCNC: 12.7 G/DL — SIGNIFICANT CHANGE UP (ref 12–16)
MCHC RBC-ENTMCNC: 30.2 PG — SIGNIFICANT CHANGE UP (ref 27–31)
MCHC RBC-ENTMCNC: 33 G/DL — SIGNIFICANT CHANGE UP (ref 32–37)
MCV RBC AUTO: 91.7 FL — SIGNIFICANT CHANGE UP (ref 81–99)
NRBC # BLD: 0 /100 WBCS — SIGNIFICANT CHANGE UP (ref 0–0)
ORGANISM # SPEC MICROSCOPIC CNT: SIGNIFICANT CHANGE UP
ORGANISM # SPEC MICROSCOPIC CNT: SIGNIFICANT CHANGE UP
PLATELET # BLD AUTO: 191 K/UL — SIGNIFICANT CHANGE UP (ref 130–400)
POTASSIUM SERPL-MCNC: 3.3 MMOL/L — LOW (ref 3.5–5)
POTASSIUM SERPL-SCNC: 3.3 MMOL/L — LOW (ref 3.5–5)
RBC # BLD: 4.2 M/UL — SIGNIFICANT CHANGE UP (ref 4.2–5.4)
RBC # FLD: 14.8 % — HIGH (ref 11.5–14.5)
SODIUM SERPL-SCNC: 141 MMOL/L — SIGNIFICANT CHANGE UP (ref 135–146)
SPECIMEN SOURCE: SIGNIFICANT CHANGE UP
WBC # BLD: 5.99 K/UL — SIGNIFICANT CHANGE UP (ref 4.8–10.8)
WBC # FLD AUTO: 5.99 K/UL — SIGNIFICANT CHANGE UP (ref 4.8–10.8)

## 2019-07-11 PROCEDURE — 99233 SBSQ HOSP IP/OBS HIGH 50: CPT

## 2019-07-11 RX ORDER — POTASSIUM CHLORIDE 20 MEQ
40 PACKET (EA) ORAL ONCE
Refills: 0 | Status: COMPLETED | OUTPATIENT
Start: 2019-07-11 | End: 2019-07-11

## 2019-07-11 RX ORDER — DOCUSATE SODIUM 100 MG
100 CAPSULE ORAL DAILY
Refills: 0 | Status: DISCONTINUED | OUTPATIENT
Start: 2019-07-11 | End: 2019-07-15

## 2019-07-11 RX ORDER — MAGNESIUM HYDROXIDE 400 MG/1
30 TABLET, CHEWABLE ORAL DAILY
Refills: 0 | Status: DISCONTINUED | OUTPATIENT
Start: 2019-07-11 | End: 2019-07-15

## 2019-07-11 RX ADMIN — CEFTRIAXONE 100 MILLIGRAM(S): 500 INJECTION, POWDER, FOR SOLUTION INTRAMUSCULAR; INTRAVENOUS at 17:21

## 2019-07-11 RX ADMIN — RISPERIDONE 4 MILLIGRAM(S): 4 TABLET ORAL at 17:21

## 2019-07-11 RX ADMIN — CHLORHEXIDINE GLUCONATE 1 APPLICATION(S): 213 SOLUTION TOPICAL at 05:54

## 2019-07-11 RX ADMIN — Medication 500 MILLIGRAM(S): at 17:22

## 2019-07-11 RX ADMIN — HEPARIN SODIUM 5000 UNIT(S): 5000 INJECTION INTRAVENOUS; SUBCUTANEOUS at 13:14

## 2019-07-11 RX ADMIN — Medication 30 MILLIGRAM(S): at 05:54

## 2019-07-11 RX ADMIN — HEPARIN SODIUM 5000 UNIT(S): 5000 INJECTION INTRAVENOUS; SUBCUTANEOUS at 05:54

## 2019-07-11 RX ADMIN — Medication 40 MILLIEQUIVALENT(S): at 18:17

## 2019-07-11 RX ADMIN — HEPARIN SODIUM 5000 UNIT(S): 5000 INJECTION INTRAVENOUS; SUBCUTANEOUS at 21:32

## 2019-07-11 RX ADMIN — RISPERIDONE 4 MILLIGRAM(S): 4 TABLET ORAL at 05:54

## 2019-07-11 RX ADMIN — Medication 500 MILLIGRAM(S): at 05:55

## 2019-07-11 RX ADMIN — Medication 100 MILLIGRAM(S): at 11:32

## 2019-07-11 NOTE — PROGRESS NOTE ADULT - SUBJECTIVE AND OBJECTIVE BOX
KARRIE CARLISLE  78y, Female  Allergy: No Known Allergies      CHIEF COMPLAINT: Failure to thrive (2019 09:58)      INTERVAL EVENTS/HPI  - No acute events overnight  - T(F): , Max: 96.6 (07-10-19 @ 21:29)  - Denies any worsening symptoms  - Tolerating medication  - WBC Count: 5.99 K/uL (19 @ 07:10)      ROS  General: Denies rigors, nightsweats  HEENT: Denies headache, rhinorrhea, sore throat, eye pain  CV: Denies CP, palpitations  PULM: Denies SOB, wheezing  GI: Denies abdominal pain, hematochezia/melena  : Denies dysuria, hematuria  MSK: Denies arthralgias, myalgias  SKIN: Denies rash, lesions  NEURO: Denies paresthesias, weakness  PSYCH: Denies depression, anxiety    VITALS:  T(F): 96.2, Max: 96.6 (07-10-19 @ 21:29)  HR: 83  BP: 124/76  RR: 18Vital Signs Last 24 Hrs  T(C): 35.7 (2019 13:10), Max: 35.9 (10 Jul 2019 21:29)  T(F): 96.2 (2019 13:10), Max: 96.6 (10 Jul 2019 21:29)  HR: 83 (2019 13:10) (73 - 85)  BP: 124/76 (2019 13:10) (124/76 - 141/76)  BP(mean): --  RR: 18 (2019 13:10) (18 - 18)  SpO2: --    PHYSICAL EXAM:  Gen: Obese. NAD, resting in bed  HEENT: Normocephalic, atraumatic  Neck: supple, no lymphadenopathy  CV: Regular rate & regular rhythm  Lungs: decreased BS at bases, no fremitus  Abdomen: Soft, BS present  Ext: Warm, well perfused  Neuro: non focal, awake  Skin: no rash, no erythema  Lines: no phlebitis      FH: Non-contributory  Social Hx: Non-contributory    TESTS & MEASUREMENTS:                        12.7   5.99  )-----------( 191      ( 2019 07:10 )             38.5     07-11    141  |  104  |  18  ----------------------------<  106<H>  3.3<L>   |  25  |  0.9    Ca    9.0      2019 07:10  Mg     2.0     07-10    TPro  6.4  /  Alb  3.1<L>  /  TBili  0.6  /  DBili  x   /  AST  28  /  ALT  38  /  AlkPhos  82  07-10    eGFR if Non African American: 62 mL/min/1.73M2 (19 @ 07:10)  eGFR if : 71 mL/min/1.73M2 (19 @ 07:10)    LIVER FUNCTIONS - ( 10 Jul 2019 07:15 )  Alb: 3.1 g/dL / Pro: 6.4 g/dL / ALK PHOS: 82 U/L / ALT: 38 U/L / AST: 28 U/L / GGT: x               Culture - Blood (collected 07-10-19 @ 07:15)  Source: .Blood None  Preliminary Report (19 @ 14:00):    No growth to date.    Culture - Blood (collected 19 @ 12:02)  Source: .Blood None  Preliminary Report (07-10-19 @ 23:01):    No growth to date.    Culture - Blood (collected 19 @ 07:18)  Source: .Blood None  Preliminary Report (07-10-19 @ 14:01):    No growth to date.    Culture - Blood (collected 19 @ 16:10)  Source: .Blood Blood  Gram Stain (19 @ 10:13):    Growth in aerobic bottle: Gram Negative Rods    Growth in anaerobic bottle: Gram Negative Rods  Final Report (07-10-19 @ 08:24):    Growth in aerobic and anaerobic bottles: Escherichia coli    "Due to technical problems, Proteus sp. will Not be reported as part of    the BCID panel until further notice" ***Blood Panel PCR results on this    specimen are available    approximately 3 hours after the Gram stain result.***    Gram stain, PCR, and/or culture results may not always    correspond due to difference in methodologies.    ************************************************************    This PCR assay was performed using [employer].    The following targets are tested for: Enterococcus,    vancomycin resistant enterococci, Listeria monocytogenes,    coagulase negative staphylococci, S. aureus,    methicillin resistant S. aureus, Streptococcus agalactiae    (Group B), S. pneumoniae, S.pyogenes (Group A),    Acinetobacter baumannii, Enterobacter cloacae, E. coli,    Klebsiella oxytoca, K. pneumoniae, Proteus sp.,    Serratia marcescens, Haemophilus influenzae,    Neisseria meningitidis, Pseudomonas aeruginosa, Candida    albicans, C. glabrata, C krusei, C parapsilosis,    C. tropicalis and the KPC resistance gene.  Organism: Blood Culture PCR  Escherichia coli (07-10-19 @ 08:24)  Organism: Escherichia coli (07-10-19 @ 08:24)      -  Amikacin: S <=8      -  Ampicillin: R >16 These ampicillin results predict results for amoxicillin      -  Ampicillin/Sulbactam: I 16/8 Enterobacter, Citrobacter, and Serratia may develop resistance during prolonged therapy (3-4 days)      -  Aztreonam: S <=4      -  Cefazolin: S <=2 Enterobacter, Citrobacter, and Serratia may develop resistance during prolonged therapy (3-4 days)      -  Cefepime: S <=2      -  Cefoxitin: S <=4      -  Ceftriaxone: S <=1 Enterobacter, Citrobacter, and Serratia may develop resistance during prolonged therapy      -  Ciprofloxacin: S <=0.5      -  Ertapenem: S <=0.5      -  Gentamicin: R >8      -  Imipenem: S <=1      -  Levofloxacin: S <=1      -  Meropenem: S <=1      -  Piperacillin/Tazobactam: S <=8      -  Tobramycin: I 8      -  Trimethoprim/Sulfamethoxazole: R >2/38      Method Type: ABIOLA  Organism: Blood Culture PCR (07-10-19 @ 08:24)      -  Escherichia coli: Detec      Method Type: PCR    Culture - Blood (collected 19 @ 16:00)  Source: .Blood Blood  Gram Stain (07-10-19 @ 20:48):    Growth in aerobic bottle: Gram Positive Rods    Growth in anaerobic bottle: Gram Positive Cocci in Clusters  Preliminary Report (07-10-19 @ 21:02):    Growth in aerobic bottle: Corynebacterium sp.  presumptively not jeikeium    "Susceptibilities not performed"    Growth in anaerobic bottle: Gram Positive Cocci in Clusters    "Due to technical problems, Proteus sp. will Not be reported as part of    the BCID panel until further notice"    ***Blood Panel PCR results on this specimen are available    approximately 3 hours after the Gram stain result.***    Gram stain, PCR, and/or culture results may not always    correspond due to difference in methodologies.    ************************************************************    This PCR assay was performed using [employer].    The following targets are tested for: Enterococcus,    vancomycin resistant enterococci, Listeria monocytogenes,    coagulase negative staphylococci, S. aureus,    methicillin resistant S. aureus, Streptococcus agalactiae    (Group B), S. pneumoniae, S. pyogenes (Group A),    Acinetobacter baumannii, Enterobacter cloacae, E. coli,    Klebsiella oxytoca, K. pneumoniae, Proteus sp.,    Serratia marcescens, Haemophilus influenzae,    Neisseria meningitidis, Pseudomonas aeruginosa, Candida    albicans, C. glabrata, C krusei, C parapsilosis,    C. tropicalis and the KPC resistance gene.  Organism: Blood Culture PCR (07-10-19 @ 20:49)  Organism: Blood Culture PCR (07-10-19 @ 20:49)      -  Acinetobacter baumanii: Nondet      -  Candida albicans: Nondet      -  Candida glabrata: Nondet      -  Candida krusei: Nondet      -  Candida parapsilosis: Nondet      -  Candida tropicalis: Nondet      -  Coagulase negative Staphylococcus: Nondet      -  Enterobacter cloacae complex: Nondet      -  Enterococcus species: Nondet      -  Escherichia coli: Nondet      -  Haemophilus influenzae: Nondet      -  Klebsiella oxytoca: Nondet      -  Klebsiella pneumoniae: Nondet      -  Listeria monocytogenes: Nondet      -  Methicillin resistant Staphylococcus aureus (MRSA): Nondet      -  Multidrug (KPC pos) resistant organism: Nondet      -  Neisseria meningitidis: Nondet      -  Pseudomonas aeruginosa: Nondet      -  Serratia marcescens: Nondet      -  Staphylococcus aureus: Nondet      -  Streptococcus agalactiae (Group B): Nondet      -  Streptococcus pneumoniae: Nondet      -  Streptococcus pyogenes (Group A): Nondet      -  Streptococcus sp. (Not Grp A, B or S pneumoniae): Nondet      -  Vancomycin resistant Enterococcus sp.: Nondet      Method Type: PCR    Culture - Urine (collected 19 @ 13:35)  Source: .Urine Clean Catch (Midstream)  Final Report (19 @ 21:09):    No growth        Blood Gas Venous - Lactate: 1.6 mmoL/L (19 @ 14:31)      INFECTIOUS DISEASES TESTING      RADIOLOGY & ADDITIONAL TESTS:  I have personally reviewed the last Chest xray  CXR      CT      CARDIOLOGY TESTING  Transthoracic Echocardiogram:    EXAM:  2-D ECHO (TTE) COMPLETE        PROCEDURE DATE:  2019      INTERPRETATION:  REPORT:    TRANSTHORACIC ECHOCARDIOGRAM REPORT         Patient Name:   KARRIE CARLISLE Accession #: [id number]  Medical Rec #:  [id number]              Height:      62.0 in 157.5 cm  YOB: 1940              Weight:      194.0 lb 88.00 kg  Patient Age:    78 years               BSA:         1.89 m²  Patient Gender: F                      BP:          123/63 mmHg       Date of Exam:        2019 2:55:25 PM  Referring Physician: [id number] ED UNASSIGNED  Sonographer:         Tiffani Jeffers  Reading Physician:   Kacey Tejeda MD.    Procedure:     2D Echo/Doppler/Color Doppler Complete.  Indications:   R01.1 - Cardiac Murmur, unspecified  Diagnosis:     Cardiac murmur, unspecified - R01.1  Study Details: Technically difficult study. Study quality was adversely   affected                 due to body habitus and the patient being uncooperative.         Summary:   1. LV Ejection Fraction by Limon's Method with a biplane EF of 66 %.   2. Elevated left atrial and left ventricular end-diastolic pressures.   3. Mild concentric left ventricular hypertrophy.   4. Mildly increased LV wall thickness.   5. Normal left ventricular internal cavity size.   6. Spectral Doppler shows impaired relaxation pattern of left   ventricular myocardial filling (Grade I diastolic dysfunction).   7. Estimated pulmonary artery systolic pressure is 44.1 mmHg assuming a   right atrial pressure of 8 mmHg,which is consistent with mild pulmonary   hypertension.   8. Pulmonary hypertension is present.   9. LA volume Index is 33.6 ml/m² ml/m2.    PHYSICIAN INTERPRETATION:  Left Ventricle: The left ventricular internal cavity size is normal. Left   ventricular wall thickness is mildly increased. There is mild concentric   left ventricular hypertrophy. Normal segmental left ventricular systolic   function. Spectral Doppler shows impaired relaxation pattern of left   ventricular myocardial filling (Grade I diastolic dysfunction). Elevated   left atrial and left ventricular end-diastolic pressures.  Right Ventricle: The right ventricular size is mildly enlarged. RV wall   thickness is normal. RV systolic function is mildly reduced.  Left Atrium: Mildly enlarged left atrium. LA volume Index is 33.6 ml/m²   ml/m2.  Right Atrium: Normal right atrial size.  Pericardium: There is no evidence of pericardial effusion.  Mitral Valve: Structurally normal mitral valve, with normal leaflet   excursion. The mitral valve is normal in structure. No evidence of mitral   valve regurgitation is seen.  Tricuspid Valve: Structurally normal tricuspid valve, with normal leaflet   excursion. The tricuspid valve is normal in structure. Mild tricuspid   regurgitation is visualized. Estimated pulmonary artery systolic pressure   is 44.1 mmHg assuming a right atrial pressure of 8 mmHg, which is   consistent with mild pulmonary hypertension.  Aortic Valve: Normal trileaflet aortic valve with normal opening. The   aortic valve is normal. Peak transaortic gradient equals 15.7 mmHg, mean   transaortic gradient equals 9.6 mmHg, the calculated aortic valve area   equals 2.51 cm² by the continuity equation consistent with normally   opening aortic valve. No evidence of aortic valve regurgitation is seen.  Pulmonic Valve: Structurally normal pulmonic valve, with normal leaflet   excursion. The pulmonic valve is normal. No indication of pulmonic valve   regurgitation.  Aorta: The aortic root and ascending aorta arestructurally normal, with   no evidence of dilitation.  Pulmonary Artery: The main pulmonary artery is normal in size. Pulmonary   hypertension is present.  Venous: The inferior vena cava was dilated, with respiratory size   variation greater than 50%.       2D AND M-MODE MEASUREMENTS (normal ranges within parentheses):  Left                  Normal   Aorta/Left             Normal  Ventricle:                     Atrium:  IVSd (2D):  1.17 cm  (0.7-1.1) AoV Cusp       1.51  (1.5-2.6)  LVPWd (2D): 1.20 cm  (0.7-1.1) Separation:     cm  LVIDd (2D): 4.41 cm  (3.4-5.7) Left Atrium    4.07  (1.9-4.0)  LVIDs (2D): 2.85 cm            (Mmode):        cm  LV FS (2D):  35.4 %   (>25%)   LA Volume      33.7  Relative      0.54    (<0.42)  Index         ml/m²  Wall                           Right  Thickness                      Ventricle:                                 RVd (2D):      3.73 cm                                 TAPSE:         1.60 cm    SPECTRAL DOPPLER ANALYSIS:  LV DIASTOLIC FUNCTION:  MV Peak E: 0.62 m/s Decel Time: 175 msec  MV Peak A: 1.22 m/s  E/A Ratio: 0.50    Aortic Valve:  AoV VMax:    1.98 m/s  AoV Area, Vmax: 2.16 cm² Vmax Indx: 1.14 cm²/m²  AoV VTI:     0.33 m    AoV Area, VTI:  2.51 cm² VTI Indx:  1.33 cm²/m²  AoV Pk Grad: 15.7 mmHg  AoV Mn Grad: 9.6 mmHg    LVOT Vmax: 1.42 m/s  LVOT VTI:  0.27 m  LVOT Diam: 1.96 cm    Mitral Valve:  MV P1/2 Time: 50.79 msec  MV Area, PHT: 4.33 cm²    Tricuspid Valve and PA/RV Systolic Pressure: TR Max Velocity: 3.00 m/s RA   Pressure: 8 mmHg RVSP/PASP: 44.1 mmHg    Pulmonic Valve:  PV Max Velocity: 1.01 m/s PV Max P.1 mmHg PV Mean PG:       [id number] Kacey Tejeda MD, Electronically signed on 2019 at 4:13:41   PM              *** Final ***                    KACEY TEJEDA MD  This document has been electronically signed. 2019  2:55PM             (19 @ 14:55)  12 Lead ECG:   Ventricular Rate 86 BPM    Atrial Rate 86 BPM    P-R Interval 254 ms    QRS Duration 92 ms    Q-T Interval 388 ms    QTC Calculation(Bezet) 464 ms    R Axis -32 degrees    T Axis 31 degrees    Diagnosis Line Sinus rhythm with 1st degree A-V block  Left axis deviation  Voltage criteria for left ventricular hypertrophy  Cannot rule out Septal infarct , age undetermined  Abnormal ECG    Confirmed by Terri Brink MD ([id number]) on 2019 7:39:53 AM (19 @ 20:10)      MEDICATIONS  cefTRIAXone   IVPB 2000  chlorhexidine 4% Liquid 1  docusate sodium 100  heparin  Injectable 5000  metroNIDAZOLE    Tablet 500  NIFEdipine XL 30  risperiDONE   Tablet 4      ANTIBIOTICS:  cefTRIAXone   IVPB 2000 milliGRAM(s) IV Intermittent every 24 hours  metroNIDAZOLE    Tablet 500 milliGRAM(s) Oral two times a day

## 2019-07-11 NOTE — CHART NOTE - NSCHARTNOTEFT_GEN_A_CORE
Registered Dietitian Follow-Up     Patient Profile Reviewed                           Yes [x]   No []     Nutrition History Previously Obtained        Yes [x]  No []       Pertinent Subjective Information: Spoke with pt who is currently consuming ~25% of her meal trays currently, not much of an appetite. Calorie count was restarted on 7/9 as it got lost in transfer from ED; will calculate 3-Day calorie results tomorrow as today is the last day to record intake.      Pertinent Medical Interventions: ID consult appreciated-  If repeat bcx NG, on d/c plan for PO Cipro 500mg BID and Flagyl 500mg BID to complete 7 days; awaiting sensitivities.  Acute kidney injury, resolved. Daughter will prefer pt to go back home on discharge, not SNF, with home help.    Diet order: Mechanical Soft      Anthropometrics:  - Ht. 62"  - Wt. 88.4kg (6/8)  - %wt change  - BMI: 35.7   - IBW: 110#     Pertinent Lab Data: Reviewed (7/11): K+ 3.3, Glu 106     Pertinent Meds: heparin, abx, nifedipine, MOM, colace     Physical Findings:  - Appearance: alert and oriented; no edema noted currently   - GI function: ?constipation, no bm documented since adm   - Oral/Mouth cavity: denies difficulty swallowing/chewing  - Skin: stage 2 pressure injury to sacrum     Nutrition Requirements  Weight Used: ideal  IBW: 110#/50kg; needs continued from RD note on 7/8      Calories: 8322-3792 kcal/day (25-30 kcal/kg IBW)--morbid obesity considered  Protein: 50-60 gm/day (1-1.2 gm/kg IBW)  Fluid: 1 ml/kcal     Nutrient Intake: not meeting kcal/pro needs at this time      Previous Nutrition Diagnosis: Increased Nutrient Needs (ongoing)      Nutrition Intervention: meals and snacks, medical food supplements     Recommendations:  1. Order Ensure Enlive BID   2. Consider daily MVI     Goal/Expected Outcome: Pt to consume >50% of meals, snacks, and supplements within 4 days    Indicator/Monitoring: RD to monitor diet order, energy intake, renal/liver profile, nutrition focused physical findings, body composition

## 2019-07-11 NOTE — PROGRESS NOTE ADULT - ATTENDING COMMENTS
#Progress Note Handoff  Pending (specify):  Consults_________, Tests________, Test Results_______, Other__blood culture sensitivities_______  Family discussion: rosa pt   Disposition: Home_x__/SNF___/Other________/Unknown at this time________
#Progress Note Handoff  Pending (specify):  Consults_________, Tests________, Test Results_______, Other__Bacteremia_______  Family discussion: rosa pt   Disposition: Home__x_/SNF___/Other________/Unknown at this time________
Patient seen and examined. She is asymptomatic. No events overnight. She is pending blood culture sensitivities of the gram positive cocci growing in the anaerobic bottles.     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

## 2019-07-11 NOTE — PROGRESS NOTE ADULT - SUBJECTIVE AND OBJECTIVE BOX
KARRIE CARLISLE 78y Female  MRN#: [id number]   CODE STATUS: FULL      SUBJECTIVE  Patient is a 78y old Female who presents with a chief complaint of Failure to thrive (10 Jul 2019 13:28)  Currently admitted to medicine with the primary diagnosis of Sepsis    Today is hospital day 3d, and this morning she is doing well. No overnight events.      OBJECTIVE  PAST MEDICAL & SURGICAL HISTORY  COPD (chronic obstructive pulmonary disease)  Hypertension  Schizo affective schizophrenia  H/O total hysterectomy    ALLERGIES:  No Known Allergies    MEDICATIONS:  STANDING MEDICATIONS  cefTRIAXone   IVPB 2000 milliGRAM(s) IV Intermittent every 24 hours  chlorhexidine 4% Liquid 1 Application(s) Topical <User Schedule>  docusate sodium 100 milliGRAM(s) Oral daily  heparin  Injectable 5000 Unit(s) SubCutaneous every 8 hours  metroNIDAZOLE    Tablet 500 milliGRAM(s) Oral two times a day  NIFEdipine XL 30 milliGRAM(s) Oral daily  risperiDONE   Tablet 4 milliGRAM(s) Oral two times a day    PRN MEDICATIONS  magnesium hydroxide Suspension 30 milliLiter(s) Oral daily PRN      VITAL SIGNS: Last 24 Hours  T(C): 35.2 (11 Jul 2019 04:00), Max: 35.9 (10 Jul 2019 21:29)  T(F): 95.4 (11 Jul 2019 04:00), Max: 96.6 (10 Jul 2019 21:29)  HR: 73 (11 Jul 2019 04:00) (73 - 85)  BP: 141/76 (11 Jul 2019 04:00) (126/- - 141/76)  BP(mean): --  RR: 18 (11 Jul 2019 04:00) (18 - 18)  SpO2: --    LABS:                        12.7   5.99  )-----------( 191      ( 11 Jul 2019 07:10 )             38.5     07-11    141  |  104  |  18  ----------------------------<  106<H>  3.3<L>   |  25  |  0.9    Ca    9.0      11 Jul 2019 07:10  Mg     2.0     07-10    TPro  6.4  /  Alb  3.1<L>  /  TBili  0.6  /  DBili  x   /  AST  28  /  ALT  38  /  AlkPhos  82  07-10              Culture - Blood (collected 09 Jul 2019 12:02)  Source: .Blood None  Preliminary Report (10 Jul 2019 23:01):    No growth to date.    Culture - Blood (collected 09 Jul 2019 07:18)  Source: .Blood None  Preliminary Report (10 Jul 2019 14:01):    No growth to date.      CARDIAC MARKERS ( 09 Jul 2019 12:02 )  x     / 0.07 ng/mL / x     / x     / 3.7 ng/mL      PHYSICAL EXAM:    GENERAL: NAD, well-developed, AAOx3  HEENT:  Atraumatic, Normocephalic. EOMI, PERRLA, conjunctiva and sclera clear, No JVD  PULMONARY: Clear to auscultation bilaterally; No wheeze  CARDIOVASCULAR: Regular rate and rhythm; No murmurs, rubs, or gallops  GASTROINTESTINAL: Soft, Nontender, Nondistended; Bowel sounds present  MUSCULOSKELETAL:  2+ Peripheral Pulses, No clubbing, cyanosis, or edema  NEUROLOGY: non-focal  SKIN: No rashes or lesions      ADMISSION SUMMARY  Patient is a 78y old Female who presents with a chief complaint of Failure to thrive (10 Jul 2019 13:28)  Currently admitted to medicine with the primary diagnosis of Sepsis  Hospital course has been complicated by _______.       ASSESSMENT & PLAN    1. SEPSIS      2.    3. COPD (chronic obstructive pulmonary disease)  Hypertension  Schizo affective schizophrenia        Present today:  ( ) Congestive Heart Failure, Yes? ( )Acute / ( )Acute on Chronic / ( )Chronic  :  ( )Systolic / ( )Diastolic               Plan:  ( ) Complicated Pneumonia, Type?  ( )Parapneumonic effusion / ( )Abscess / ( ) Multilobar / ( )Other               Plan:  ( ) Morbid Obesity, Yes? BMI:               Plan:  ( ) Functional Quadriplegia               Plan:  ( ) Encephalopathy               Plan:    ( ) Discussion with patient and/or family regarding goals of care  ( ) Discussed Case and Plan with Medical Attending, Name: KARRIE CARLISLE 78y Female  MRN#: [id number]   CODE STATUS: FULL      SUBJECTIVE  Patient is a 78y old Female who presents with a chief complaint of Failure to thrive (10 Jul 2019 13:28)  Currently admitted to medicine with the primary diagnosis of bacteremia     Today is hospital day 3d, and this morning she is doing well. No overnight events.      OBJECTIVE  PAST MEDICAL & SURGICAL HISTORY  COPD (chronic obstructive pulmonary disease)  Hypertension  Schizo affective schizophrenia  H/O total hysterectomy    ALLERGIES:  No Known Allergies    MEDICATIONS:  STANDING MEDICATIONS  cefTRIAXone   IVPB 2000 milliGRAM(s) IV Intermittent every 24 hours  chlorhexidine 4% Liquid 1 Application(s) Topical <User Schedule>  docusate sodium 100 milliGRAM(s) Oral daily  heparin  Injectable 5000 Unit(s) SubCutaneous every 8 hours  metroNIDAZOLE    Tablet 500 milliGRAM(s) Oral two times a day  NIFEdipine XL 30 milliGRAM(s) Oral daily  risperiDONE   Tablet 4 milliGRAM(s) Oral two times a day    PRN MEDICATIONS  magnesium hydroxide Suspension 30 milliLiter(s) Oral daily PRN      VITAL SIGNS: Last 24 Hours  T(C): 35.2 (11 Jul 2019 04:00), Max: 35.9 (10 Jul 2019 21:29)  T(F): 95.4 (11 Jul 2019 04:00), Max: 96.6 (10 Jul 2019 21:29)  HR: 73 (11 Jul 2019 04:00) (73 - 85)  BP: 141/76 (11 Jul 2019 04:00) (126/- - 141/76)  BP(mean): --  RR: 18 (11 Jul 2019 04:00) (18 - 18)  SpO2: --    LABS:                        12.7   5.99  )-----------( 191      ( 11 Jul 2019 07:10 )             38.5     07-11    141  |  104  |  18  ----------------------------<  106<H>  3.3<L>   |  25  |  0.9    Ca    9.0      11 Jul 2019 07:10  Mg     2.0     07-10    TPro  6.4  /  Alb  3.1<L>  /  TBili  0.6  /  DBili  x   /  AST  28  /  ALT  38  /  AlkPhos  82  07-10              Culture - Blood (collected 09 Jul 2019 12:02)  Source: .Blood None  Preliminary Report (10 Jul 2019 23:01):    No growth to date.    Culture - Blood (collected 09 Jul 2019 07:18)  Source: .Blood None  Preliminary Report (10 Jul 2019 14:01):    No growth to date.      CARDIAC MARKERS ( 09 Jul 2019 12:02 )  x     / 0.07 ng/mL / x     / x     / 3.7 ng/mL      PHYSICAL EXAM:    GENERAL: NAD, AAOx3  HEENT: EOMI, conjunctiva and sclera clear, No JVD  PULMONARY: Clear to auscultation bilaterally; No wheeze  CARDIOVASCULAR: Regular rate and rhythm; No murmurs, rubs, or gallops  GASTROINTESTINAL: Soft, Nontender, Nondistended  MUSCULOSKELETAL:  2+ Peripheral Pulses  NEUROLOGY: non-focal  SKIN: No rashes or lesions      ADMISSION SUMMARY  Patient is a 78y old Female who presents with a chief complaint of Failure to thrive (10 Jul 2019 13:28)  Currently admitted to medicine with the primary diagnosis of bacteremia       ASSESSMENT & PLAN  79y/o w/ past medical history of hypertension (not on any meds), COPD (not on any meds), and schizophrenia presenting w/ chief complaint of decreased PO intake and inability to ambulate found to have ROSSY and mild troponemia.      #Sepsis POA (leukocytosis, tachycardia) 2/2 Polymicrobial bacteremia   - + for E. Coli & gram + rods, gram + cocci in clusters (f/u with ID)   - d/c zosyn, on ceftriaxone & PO flagyl   - f/u repeat blood cultures  - ID consult appreciated-  If repeat bcx NG, on d/c plan for PO Cipro 500mg BID and Flagyl 500mg BID to complete 7 days   - awaiting sensitivities     #Acute kidney injury, resolved   - likely pre-renal due to poor PO intake   - Cr 1.6 on admission (baseline normal 1.1 in 2018), currently 1.1   - d/c fluids  -Trend BMP and send urine studies if no improvement  -No evidence of hydronephrosis on CT A/P    #Transaminitis, resolved   - AST/ALT: 28/38. ALP 82 now Bilirubin 0.6.  (downtrending liver enzymes)   - likely due to cholestatic liver injury due to sepsis   - CT A/P w/ contrast negative  - US RUQ ordered- gallbladder stones & sludge w/o sonographic evidence of acute cholecystitis, no acute intervention     #Mild Troponemia  - Trops 0.01 -> 0.03 --> 0.04 --> 0.7  - Pt denies chest pain, sob, palpitations, nausea or vomiting. EKG negative for ischemic changes  -f/u CK-MB, 3.7   - TTE ordered- positive for pulmonary HTN (pt has COPD, not on home meds)     #Failure to thrive  - Decreased PO intake, decreased ambulation, dehydration  - Calorie count and dietary eval-   - Dash diet with 1:1 PO feeds  - PT/Rehab evaluated   - Daughter will prefer pt to go back home on discharge, not SNF, with home help  - case management aware     #Abdominal wall hernia with loop of non-obstructed large bowel  -Incidental finding on CT scan  - Pt seen by surgery, f/u w/ Matt Viveros as outpt     #Schizophrenia  - C/w Risperidone 4mg q12hr    #HTN   -not on home meds  -taking nifedipine here     #COPD  -not on home meds   -controlled for now, will continue to monitor     Diet: DASH  DVT PPX: Heparin SQ  Activity: out of bed to chair   Dispo: will discuss with daughter, need PMD, need to find out what pharmacy to send meds to    Code Status: Full KARRIE CARLISLE 78y Female  MRN#: [id number]   CODE STATUS: FULL      SUBJECTIVE  Patient is a 78y old Female who presents with a chief complaint of Failure to thrive (10 Jul 2019 13:28)  Currently admitted to medicine with the primary diagnosis of bacteremia     Today is hospital day 3d, and this morning she is doing well. No overnight events.      OBJECTIVE  PAST MEDICAL & SURGICAL HISTORY  COPD (chronic obstructive pulmonary disease)  Hypertension  Schizo affective schizophrenia  H/O total hysterectomy    ALLERGIES:  No Known Allergies    MEDICATIONS:  STANDING MEDICATIONS  cefTRIAXone   IVPB 2000 milliGRAM(s) IV Intermittent every 24 hours  chlorhexidine 4% Liquid 1 Application(s) Topical <User Schedule>  docusate sodium 100 milliGRAM(s) Oral daily  heparin  Injectable 5000 Unit(s) SubCutaneous every 8 hours  metroNIDAZOLE    Tablet 500 milliGRAM(s) Oral two times a day  NIFEdipine XL 30 milliGRAM(s) Oral daily  risperiDONE   Tablet 4 milliGRAM(s) Oral two times a day    PRN MEDICATIONS  magnesium hydroxide Suspension 30 milliLiter(s) Oral daily PRN      VITAL SIGNS: Last 24 Hours  T(C): 35.2 (11 Jul 2019 04:00), Max: 35.9 (10 Jul 2019 21:29)  T(F): 95.4 (11 Jul 2019 04:00), Max: 96.6 (10 Jul 2019 21:29)  HR: 73 (11 Jul 2019 04:00) (73 - 85)  BP: 141/76 (11 Jul 2019 04:00) (126/- - 141/76)  BP(mean): --  RR: 18 (11 Jul 2019 04:00) (18 - 18)  SpO2: --    LABS:                        12.7   5.99  )-----------( 191      ( 11 Jul 2019 07:10 )             38.5     07-11    141  |  104  |  18  ----------------------------<  106<H>  3.3<L>   |  25  |  0.9    Ca    9.0      11 Jul 2019 07:10  Mg     2.0     07-10    TPro  6.4  /  Alb  3.1<L>  /  TBili  0.6  /  DBili  x   /  AST  28  /  ALT  38  /  AlkPhos  82  07-10              Culture - Blood (collected 09 Jul 2019 12:02)  Source: .Blood None  Preliminary Report (10 Jul 2019 23:01):    No growth to date.    Culture - Blood (collected 09 Jul 2019 07:18)  Source: .Blood None  Preliminary Report (10 Jul 2019 14:01):    No growth to date.      CARDIAC MARKERS ( 09 Jul 2019 12:02 )  x     / 0.07 ng/mL / x     / x     / 3.7 ng/mL      PHYSICAL EXAM:    GENERAL: NAD, AAOx3  HEENT: EOMI, conjunctiva and sclera clear, No JVD  PULMONARY: Clear to auscultation bilaterally; No wheeze  CARDIOVASCULAR: Regular rate and rhythm; No murmurs, rubs, or gallops  GASTROINTESTINAL: Soft, Nontender, Nondistended  MUSCULOSKELETAL:  2+ Peripheral Pulses  NEUROLOGY: non-focal  SKIN: No rashes or lesions      ADMISSION SUMMARY  Patient is a 78y old Female who presents with a chief complaint of Failure to thrive (10 Jul 2019 13:28)  Currently admitted to medicine with the primary diagnosis of bacteremia       ASSESSMENT & PLAN  79y/o w/ past medical history of hypertension (not on any meds), COPD (not on any meds), and schizophrenia presenting w/ chief complaint of decreased PO intake and inability to ambulate found to have ROSSY and mild troponemia.      #Sepsis POA (leukocytosis, tachycardia) 2/2 Polymicrobial bacteremia   - + for E. Coli & gram + rods, gram + cocci in clusters (f/u with ID)   - d/c zosyn, on ceftriaxone & PO flagyl   - f/u repeat blood cultures  - ID consult appreciated-  If repeat bcx NG, on d/c plan for PO Cipro 500mg BID and Flagyl 500mg BID to complete 7 days   - awaiting sensitivities     #Acute kidney injury, resolved   - likely pre-renal due to poor PO intake   - Cr 1.6 on admission (baseline normal 1.1 in 2018), currently 1.1   - d/c fluids  -Trend BMP and send urine studies if no improvement  -No evidence of hydronephrosis on CT A/P    #Transaminitis, resolved   - AST/ALT: 28/38. ALP 82 now Bilirubin 0.6.  (downtrending liver enzymes)   - likely due to cholestatic liver injury due to sepsis   - CT A/P w/ contrast negative  - US RUQ ordered- gallbladder stones & sludge w/o sonographic evidence of acute cholecystitis, no acute intervention     #Mild Troponemia  - Trops 0.01 -> 0.03 --> 0.04 --> 0.7  - Pt denies chest pain, sob, palpitations, nausea or vomiting. EKG negative for ischemic changes  -f/u CK-MB, 3.7   - TTE ordered- positive for pulmonary HTN (pt has COPD, not on home meds)     #Failure to thrive  - Decreased PO intake, decreased ambulation, dehydration  - Calorie count and dietary eval- recs appreciated   - Dash diet with 1:1 PO feeds, will add Ensure   - PT/Rehab evaluated   - Daughter will prefer pt to go back home on discharge, not SNF, with home help  - case management aware     #Abdominal wall hernia with loop of non-obstructed large bowel  -Incidental finding on CT scan  - Pt seen by surgery, f/u w/ Dr. Maynard, Matt as outpt     #Schizophrenia  - C/w Risperidone 4mg q12hr    #HTN   -not on home meds  -taking nifedipine here     #COPD  -not on home meds   -controlled for now, will continue to monitor     Diet: DASH  DVT PPX: Heparin SQ  Activity: out of bed to chair   Dispo: will discuss with daughter, will obtain MAP appointment for Monday, pharmacy- [address]   Code Status: Full KARRIE CARLISLE 78y Female  MRN#: [id number]   CODE STATUS: FULL      SUBJECTIVE  Patient is a 78y old Female who presents with a chief complaint of Failure to thrive (10 Jul 2019 13:28)  Currently admitted to medicine with the primary diagnosis of bacteremia     Today is hospital day 3d, and this morning she is doing well. No overnight events.      OBJECTIVE  PAST MEDICAL & SURGICAL HISTORY  COPD (chronic obstructive pulmonary disease)  Hypertension  Schizo affective schizophrenia  H/O total hysterectomy    ALLERGIES:  No Known Allergies    MEDICATIONS:  STANDING MEDICATIONS  cefTRIAXone   IVPB 2000 milliGRAM(s) IV Intermittent every 24 hours  chlorhexidine 4% Liquid 1 Application(s) Topical <User Schedule>  docusate sodium 100 milliGRAM(s) Oral daily  heparin  Injectable 5000 Unit(s) SubCutaneous every 8 hours  metroNIDAZOLE    Tablet 500 milliGRAM(s) Oral two times a day  NIFEdipine XL 30 milliGRAM(s) Oral daily  risperiDONE   Tablet 4 milliGRAM(s) Oral two times a day    PRN MEDICATIONS  magnesium hydroxide Suspension 30 milliLiter(s) Oral daily PRN      VITAL SIGNS: Last 24 Hours  T(C): 35.2 (11 Jul 2019 04:00), Max: 35.9 (10 Jul 2019 21:29)  T(F): 95.4 (11 Jul 2019 04:00), Max: 96.6 (10 Jul 2019 21:29)  HR: 73 (11 Jul 2019 04:00) (73 - 85)  BP: 141/76 (11 Jul 2019 04:00) (126/- - 141/76)  BP(mean): --  RR: 18 (11 Jul 2019 04:00) (18 - 18)  SpO2: --    LABS:                        12.7   5.99  )-----------( 191      ( 11 Jul 2019 07:10 )             38.5     07-11    141  |  104  |  18  ----------------------------<  106<H>  3.3<L>   |  25  |  0.9    Ca    9.0      11 Jul 2019 07:10  Mg     2.0     07-10    TPro  6.4  /  Alb  3.1<L>  /  TBili  0.6  /  DBili  x   /  AST  28  /  ALT  38  /  AlkPhos  82  07-10              Culture - Blood (collected 09 Jul 2019 12:02)  Source: .Blood None  Preliminary Report (10 Jul 2019 23:01):    No growth to date.    Culture - Blood (collected 09 Jul 2019 07:18)  Source: .Blood None  Preliminary Report (10 Jul 2019 14:01):    No growth to date.      CARDIAC MARKERS ( 09 Jul 2019 12:02 )  x     / 0.07 ng/mL / x     / x     / 3.7 ng/mL      PHYSICAL EXAM:    GENERAL: NAD, AAOx3  HEENT: EOMI, conjunctiva and sclera clear, No JVD  PULMONARY: Clear to auscultation bilaterally; No wheeze  CARDIOVASCULAR: Regular rate and rhythm; No murmurs, rubs, or gallops  GASTROINTESTINAL: Soft, Nontender, Nondistended  MUSCULOSKELETAL:  2+ Peripheral Pulses  NEUROLOGY: non-focal  SKIN: No rashes or lesions      ADMISSION SUMMARY  Patient is a 78y old Female who presents with a chief complaint of Failure to thrive (10 Jul 2019 13:28)  Currently admitted to medicine with the primary diagnosis of bacteremia       ASSESSMENT & PLAN  77y/o w/ past medical history of hypertension (not on any meds), COPD (not on any meds), and schizophrenia presenting w/ chief complaint of decreased PO intake and inability to ambulate found to have ROSSY and mild troponemia.      #Sepsis POA (leukocytosis, tachycardia) 2/2 Polymicrobial bacteremia   - + for E. Coli & gram + rods, gram + cocci in clusters (f/u with ID)   - d/c zosyn, on ceftriaxone & PO flagyl   - f/u repeat blood cultures  - ID consult appreciated-  If repeat bcx NG, on d/c plan for PO Cipro 500mg BID and Flagyl 500mg BID to complete 7 days   - awaiting sensitivities     #Acute kidney injury, resolved   - likely pre-renal due to poor PO intake   - Cr 1.6 on admission (baseline normal 1.1 in 2018), currently 1.1   - d/c fluids  -Trend BMP and send urine studies if no improvement  -No evidence of hydronephrosis on CT A/P    #Transaminitis, resolved   - AST/ALT: 28/38. ALP 82 now Bilirubin 0.6.  (downtrending liver enzymes)   - likely due to cholestatic liver injury due to sepsis   - CT A/P w/ contrast negative  - US RUQ ordered- gallbladder stones & sludge w/o sonographic evidence of acute cholecystitis, no acute intervention     #Mild Troponemia  - Trops 0.01 -> 0.03 --> 0.04 --> 0.7  - Pt denies chest pain, sob, palpitations, nausea or vomiting. EKG negative for ischemic changes  -f/u CK-MB, 3.7   - TTE ordered- positive for pulmonary HTN (pt has COPD, not on home meds)     #Failure to thrive  - Decreased PO intake, decreased ambulation, dehydration  - Calorie count and dietary eval- recs appreciated   - Dash diet with 1:1 PO feeds, will add Ensure   - PT/Rehab evaluated   - Daughter will prefer pt to go back home on discharge, not SNF, with home help  - case management aware     #Abdominal wall hernia with loop of non-obstructed large bowel  -Incidental finding on CT scan  - Pt seen by surgery, f/u w/ Dr. Maynard, Matt as outpt     #Schizophrenia  - C/w Risperidone 4mg q12hr    #HTN   -not on home meds  -taking nifedipine here     #COPD  -not on home meds   -controlled for now, will continue to monitor     Diet: DASH  DVT PPX: Heparin SQ  Activity: out of bed to chair   Dispo: will discuss with daughter, MAP appointment for Wednesday, 7/17 at 9:15 AM with Dr. Nash, pharmacy- [place] [id number]   Code Status: Full KARRIE CARLISLE 78y Female  MRN#: [id number]   CODE STATUS: FULL      SUBJECTIVE  Patient is a 78y old Female who presents with a chief complaint of Failure to thrive (10 Jul 2019 13:28)  Currently admitted to medicine with the primary diagnosis of bacteremia     Today is hospital day 3d, and this morning she is doing well. No overnight events.      OBJECTIVE  PAST MEDICAL & SURGICAL HISTORY  COPD (chronic obstructive pulmonary disease)  Hypertension  Schizo affective schizophrenia  H/O total hysterectomy    ALLERGIES:  No Known Allergies    MEDICATIONS:  STANDING MEDICATIONS  cefTRIAXone   IVPB 2000 milliGRAM(s) IV Intermittent every 24 hours  chlorhexidine 4% Liquid 1 Application(s) Topical <User Schedule>  docusate sodium 100 milliGRAM(s) Oral daily  heparin  Injectable 5000 Unit(s) SubCutaneous every 8 hours  metroNIDAZOLE    Tablet 500 milliGRAM(s) Oral two times a day  NIFEdipine XL 30 milliGRAM(s) Oral daily  risperiDONE   Tablet 4 milliGRAM(s) Oral two times a day    PRN MEDICATIONS  magnesium hydroxide Suspension 30 milliLiter(s) Oral daily PRN      VITAL SIGNS: Last 24 Hours  T(C): 35.2 (11 Jul 2019 04:00), Max: 35.9 (10 Jul 2019 21:29)  T(F): 95.4 (11 Jul 2019 04:00), Max: 96.6 (10 Jul 2019 21:29)  HR: 73 (11 Jul 2019 04:00) (73 - 85)  BP: 141/76 (11 Jul 2019 04:00) (126/- - 141/76)  BP(mean): --  RR: 18 (11 Jul 2019 04:00) (18 - 18)  SpO2: --    LABS:                        12.7   5.99  )-----------( 191      ( 11 Jul 2019 07:10 )             38.5     07-11    141  |  104  |  18  ----------------------------<  106<H>  3.3<L>   |  25  |  0.9    Ca    9.0      11 Jul 2019 07:10  Mg     2.0     07-10    TPro  6.4  /  Alb  3.1<L>  /  TBili  0.6  /  DBili  x   /  AST  28  /  ALT  38  /  AlkPhos  82  07-10              Culture - Blood (collected 09 Jul 2019 12:02)  Source: .Blood None  Preliminary Report (10 Jul 2019 23:01):    No growth to date.    Culture - Blood (collected 09 Jul 2019 07:18)  Source: .Blood None  Preliminary Report (10 Jul 2019 14:01):    No growth to date.      CARDIAC MARKERS ( 09 Jul 2019 12:02 )  x     / 0.07 ng/mL / x     / x     / 3.7 ng/mL      PHYSICAL EXAM:    GENERAL: NAD, AAOx3  HEENT: EOMI, conjunctiva and sclera clear, No JVD  PULMONARY: Clear to auscultation bilaterally; No wheeze  CARDIOVASCULAR: Regular rate and rhythm; No murmurs, rubs, or gallops  GASTROINTESTINAL: Soft, Nontender, Nondistended  MUSCULOSKELETAL:  2+ Peripheral Pulses  NEUROLOGY: non-focal  SKIN: No rashes or lesions      ADMISSION SUMMARY  Patient is a 78y old Female who presents with a chief complaint of Failure to thrive (10 Jul 2019 13:28)  Currently admitted to medicine with the primary diagnosis of bacteremia       ASSESSMENT & PLAN  77y/o w/ past medical history of hypertension (not on any meds), COPD (not on any meds), and schizophrenia presenting w/ chief complaint of decreased PO intake and inability to ambulate found to have ROSSY and mild troponemia.      #Sepsis POA (leukocytosis, tachycardia) 2/2 Polymicrobial bacteremia   - + for E. Coli & gram + rods, gram + cocci in clusters (f/u with ID)   - d/c zosyn, on ceftriaxone & PO flagyl   - f/u repeat blood cultures  - ID consult appreciated-  If repeat bcx NG, on d/c plan for PO Cipro 500mg BID and Flagyl 500mg BID to complete 7 days   - awaiting sensitivities     #Acute kidney injury, resolved   - likely pre-renal due to poor PO intake   - Cr 1.6 on admission (baseline normal 1.1 in 2018), currently 1.1   - d/c fluids  -Trend BMP and send urine studies if no improvement  -No evidence of hydronephrosis on CT A/P    #Transaminitis, resolved   - AST/ALT: 28/38. ALP 82 now Bilirubin 0.6.  (downtrending liver enzymes)   - likely due to cholestatic liver injury due to sepsis   - CT A/P w/ contrast negative  - US RUQ ordered- gallbladder stones & sludge w/o sonographic evidence of acute cholecystitis, no acute intervention     #Mild Troponemia  - Trops 0.01 -> 0.03 --> 0.04 --> 0.7  - Pt denies chest pain, sob, palpitations, nausea or vomiting. EKG negative for ischemic changes  - CK-MB, 3.7   - TTE ordered- positive for pulmonary HTN (pt has COPD, not on home meds)     #Failure to thrive  - Decreased PO intake, decreased ambulation, dehydration  - Calorie count and dietary eval- recs appreciated   - Dash diet with 1:1 PO feeds, will add Ensure   - PT/Rehab evaluated   - Daughter will prefer pt to go back home on discharge, not SNF, with home help  - case management aware     #Abdominal wall hernia with loop of non-obstructed large bowel  -Incidental finding on CT scan  - Pt seen by surgery, f/u w/ Dr. Maynard, Matt as outpt     #Schizophrenia  - C/w Risperidone 4mg q12hr    #HTN   -not on home meds  -taking nifedipine here     #COPD  -not on home meds   -controlled for now, will continue to monitor     Diet: DASH  DVT PPX: Heparin SQ  Activity: out of bed to chair   Dispo: will discuss with daughter, MAP appointment for Wednesday, 7/17 at 9:15 AM with Dr. Nash, pharmacy- [place] [address]   Code Status: Full

## 2019-07-11 NOTE — DISCHARGE NOTE NURSING/CASE MANAGEMENT/SOCIAL WORK - [email or web address]_GEN_ALL_CORE
You can access the [facility], offered by [facility], by registering with the following website: [email or web address]

## 2019-07-12 LAB
ANION GAP SERPL CALC-SCNC: 12 MMOL/L — SIGNIFICANT CHANGE UP (ref 7–14)
BUN SERPL-MCNC: 17 MG/DL — SIGNIFICANT CHANGE UP (ref 10–20)
CALCIUM SERPL-MCNC: 9.1 MG/DL — SIGNIFICANT CHANGE UP (ref 8.5–10.1)
CHLORIDE SERPL-SCNC: 105 MMOL/L — SIGNIFICANT CHANGE UP (ref 98–110)
CO2 SERPL-SCNC: 24 MMOL/L — SIGNIFICANT CHANGE UP (ref 17–32)
CREAT SERPL-MCNC: 0.8 MG/DL — SIGNIFICANT CHANGE UP (ref 0.7–1.5)
GLUCOSE SERPL-MCNC: 126 MG/DL — HIGH (ref 70–99)
HCT VFR BLD CALC: 39.8 % — SIGNIFICANT CHANGE UP (ref 37–47)
HGB BLD-MCNC: 12.9 G/DL — SIGNIFICANT CHANGE UP (ref 12–16)
MCHC RBC-ENTMCNC: 30.1 PG — SIGNIFICANT CHANGE UP (ref 27–31)
MCHC RBC-ENTMCNC: 32.4 G/DL — SIGNIFICANT CHANGE UP (ref 32–37)
MCV RBC AUTO: 92.8 FL — SIGNIFICANT CHANGE UP (ref 81–99)
NRBC # BLD: 0 /100 WBCS — SIGNIFICANT CHANGE UP (ref 0–0)
PLATELET # BLD AUTO: 211 K/UL — SIGNIFICANT CHANGE UP (ref 130–400)
POTASSIUM SERPL-MCNC: 3.6 MMOL/L — SIGNIFICANT CHANGE UP (ref 3.5–5)
POTASSIUM SERPL-SCNC: 3.6 MMOL/L — SIGNIFICANT CHANGE UP (ref 3.5–5)
RBC # BLD: 4.29 M/UL — SIGNIFICANT CHANGE UP (ref 4.2–5.4)
RBC # FLD: 15 % — HIGH (ref 11.5–14.5)
SMOOTH MUSCLE AB SER-ACNC: SIGNIFICANT CHANGE UP
SODIUM SERPL-SCNC: 141 MMOL/L — SIGNIFICANT CHANGE UP (ref 135–146)
WBC # BLD: 6.71 K/UL — SIGNIFICANT CHANGE UP (ref 4.8–10.8)
WBC # FLD AUTO: 6.71 K/UL — SIGNIFICANT CHANGE UP (ref 4.8–10.8)

## 2019-07-12 PROCEDURE — 99233 SBSQ HOSP IP/OBS HIGH 50: CPT

## 2019-07-12 RX ORDER — CIPROFLOXACIN LACTATE 400MG/40ML
500 VIAL (ML) INTRAVENOUS EVERY 12 HOURS
Refills: 0 | Status: DISCONTINUED | OUTPATIENT
Start: 2019-07-12 | End: 2019-07-15

## 2019-07-12 RX ADMIN — Medication 500 MILLIGRAM(S): at 05:35

## 2019-07-12 RX ADMIN — HEPARIN SODIUM 5000 UNIT(S): 5000 INJECTION INTRAVENOUS; SUBCUTANEOUS at 05:36

## 2019-07-12 RX ADMIN — Medication 30 MILLIGRAM(S): at 05:35

## 2019-07-12 RX ADMIN — Medication 500 MILLIGRAM(S): at 17:47

## 2019-07-12 RX ADMIN — HEPARIN SODIUM 5000 UNIT(S): 5000 INJECTION INTRAVENOUS; SUBCUTANEOUS at 14:45

## 2019-07-12 RX ADMIN — Medication 500 MILLIGRAM(S): at 11:39

## 2019-07-12 RX ADMIN — RISPERIDONE 4 MILLIGRAM(S): 4 TABLET ORAL at 17:47

## 2019-07-12 RX ADMIN — Medication 100 MILLIGRAM(S): at 11:39

## 2019-07-12 RX ADMIN — RISPERIDONE 4 MILLIGRAM(S): 4 TABLET ORAL at 05:35

## 2019-07-12 RX ADMIN — HEPARIN SODIUM 5000 UNIT(S): 5000 INJECTION INTRAVENOUS; SUBCUTANEOUS at 22:06

## 2019-07-12 NOTE — PROGRESS NOTE ADULT - SUBJECTIVE AND OBJECTIVE BOX
SUBJECTIVE  Today pt not very reliable for hx , baseline schizophrenia    OBJECTIVE  Vital Signs Last 24 Hrs  T(C): 35.9 (12 Jul 2019 12:15), Max: 37 (11 Jul 2019 20:30)  T(F): 96.6 (12 Jul 2019 12:15), Max: 98.6 (11 Jul 2019 20:30)  HR: 61 (12 Jul 2019 12:15) (61 - 79)  BP: 139/78 (12 Jul 2019 12:15) (121/58 - 152/72)  BP(mean): --  RR: 18 (12 Jul 2019 12:15) (18 - 19)  SpO2: 94% (12 Jul 2019 07:37) (94% - 94%)      PHYSICAL EXAM:    GENERAL: NAD, Awake alert, able to answer questions.  obese  HEENT: EOMI   PULMONARY: Clear to auscultation bilaterally   CARDIOVASCULAR: Regular rate and rhythm   GASTROINTESTINAL: Soft, Nontender, Nondistended, obese protuberant  MUSCULOSKELETAL:  no gross joint deform  NEUROLOGY: non-focal  SKIN: No rashes or lesions      LABS:               12.9   6.71  )-----------( 211      ( 12 Jul 2019 06:45 )             39.8     07-12    141  |  105  |  17  ----------------------------<  126<H>  3.6   |  24  |  0.8    Ca    9.1      12 Jul 2019 06:45        Culture - Blood (collected 11 Jul 2019 01:01)  Source: .Blood None  Preliminary Report (12 Jul 2019 06:01):    No growth to date.    Culture - Blood (collected 10 Jul 2019 07:15)  Source: .Blood None  Preliminary Report (11 Jul 2019 14:00):    No growth to date.        CT AP with Anterior abdominal wall hernia containing a loop of nonobstructed bowel. Additional findings after attending review: The right colon appears thickened despite underdistention. Correlate for colitis.    US with GB sludge    CXR no PNA

## 2019-07-12 NOTE — PROGRESS NOTE ADULT - SUBJECTIVE AND OBJECTIVE BOX
KARRIE CARLISLE 78y Female  MRN#: [id number]   CODE STATUS: FULL     SUBJECTIVE  Patient is a 78y old Female who presents with a chief complaint of Failure to thrive (11 Jul 2019 16:00)  Currently admitted to medicine with the primary diagnosis of sepsis    Today is hospital day 4d, and this morning she is doing well.  She lost her IV last night but she does not need it anymore for IV antibiotics.       OBJECTIVE  PAST MEDICAL & SURGICAL HISTORY  COPD (chronic obstructive pulmonary disease)  Hypertension  Schizo affective schizophrenia  H/O total hysterectomy    ALLERGIES:  No Known Allergies    MEDICATIONS:  STANDING MEDICATIONS  chlorhexidine 4% Liquid 1 Application(s) Topical <User Schedule>  ciprofloxacin     Tablet 500 milliGRAM(s) Oral every 12 hours  docusate sodium 100 milliGRAM(s) Oral daily  heparin  Injectable 5000 Unit(s) SubCutaneous every 8 hours  metroNIDAZOLE    Tablet 500 milliGRAM(s) Oral two times a day  NIFEdipine XL 30 milliGRAM(s) Oral daily  risperiDONE   Tablet 4 milliGRAM(s) Oral two times a day    PRN MEDICATIONS  magnesium hydroxide Suspension 30 milliLiter(s) Oral daily PRN      VITAL SIGNS: Last 24 Hours  T(C): 35.9 (12 Jul 2019 12:15), Max: 37 (11 Jul 2019 20:30)  T(F): 96.6 (12 Jul 2019 12:15), Max: 98.6 (11 Jul 2019 20:30)  HR: 61 (12 Jul 2019 12:15) (61 - 79)  BP: 139/78 (12 Jul 2019 12:15) (121/58 - 152/72)  BP(mean): --  RR: 18 (12 Jul 2019 12:15) (18 - 19)  SpO2: 94% (12 Jul 2019 07:37) (94% - 94%)    LABS:                        12.9   6.71  )-----------( 211      ( 12 Jul 2019 06:45 )             39.8     07-12    141  |  105  |  17  ----------------------------<  126<H>  3.6   |  24  |  0.8    Ca    9.1      12 Jul 2019 06:45      Culture - Blood (collected 11 Jul 2019 01:01)  Source: .Blood None  Preliminary Report (12 Jul 2019 06:01):    No growth to date.    Culture - Blood (collected 10 Jul 2019 07:15)  Source: .Blood None  Preliminary Report (11 Jul 2019 14:00):    No growth to date.      PHYSICAL EXAM:  GENERAL: NAD, AAOx3  HEENT:  EOMI, conjunctiva and sclera clear, No JVD  PULMONARY: Clear to auscultation bilaterally; No wheeze  CARDIOVASCULAR: Regular rate and rhythm; No murmurs, rubs, or gallops  GASTROINTESTINAL: Soft, Nontender, Nondistended  MUSCULOSKELETAL:  2+ Peripheral Pulses  NEUROLOGY: non-focal  SKIN: No rashes       ADMISSION SUMMARY  Patient is a 78y old Female who presents with a chief complaint of Failure to thrive (11 Jul 2019 16:00)  Currently admitted to medicine with the primary diagnosis of Sepsis      ASSESSMENT & PLAN  79y/o w/ past medical history of hypertension (not on any meds), COPD (not on any meds), and schizophrenia presenting w/ chief complaint of decreased PO intake and inability to ambulate found to have ROSSY and mild troponemia.      #Sepsis POA (leukocytosis, tachycardia) 2/2 Polymicrobial bacteremia   - + for E. Coli & gram + rods, gram + cocci in clusters (f/u with ID)   - d/c zosyn, on ceftriaxone & PO flagyl   - f/u repeat blood cultures  - ID consult appreciated-  If repeat bcx NG, on d/c plan for PO Cipro 500mg BID and Flagyl 500mg BID to complete 7 days   - awaiting sensitivities     #Acute kidney injury, resolved   - likely pre-renal due to poor PO intake   - Cr 1.6 on admission (baseline normal 1.1 in 2018), currently 1.1   - d/c fluids  -Trend BMP and send urine studies if no improvement  -No evidence of hydronephrosis on CT A/P    #Transaminitis, resolved   - AST/ALT: 28/38. ALP 82 now Bilirubin 0.6.  (downtrending liver enzymes)   - likely due to cholestatic liver injury due to sepsis   - CT A/P w/ contrast negative  - US RUQ ordered- gallbladder stones & sludge w/o sonographic evidence of acute cholecystitis, no acute intervention     #Mild Troponemia  - Trops 0.01 -> 0.03 --> 0.04 --> 0.7  - Pt denies chest pain, sob, palpitations, nausea or vomiting. EKG negative for ischemic changes  - CK-MB, 3.7   - TTE ordered- positive for pulmonary HTN (pt has COPD, not on home meds)     #Failure to thrive  - Decreased PO intake, decreased ambulation, dehydration  - Calorie count and dietary eval- recs appreciated   - Dash diet with 1:1 PO feeds, will add Ensure   - PT/Rehab evaluated   - Daughter will prefer pt to go back home on discharge, not SNF, with home help  - case management aware     #Abdominal wall hernia with loop of non-obstructed large bowel  -Incidental finding on CT scan  - Pt seen by surgery, f/u w/ Dr. Maynard, Matt as outpt     #Schizophrenia  - C/w Risperidone 4mg q12hr    #HTN   -not on home meds  -taking nifedipine here     #COPD  -not on home meds   -controlled for now, will continue to monitor     Diet: DASH  DVT PPX: Heparin SQ  Activity: out of bed to chair   Dispo: will discuss with daughter, MAP appointment for Wednesday, 7/17 at 9:15 AM with Dr. Nash, pharmacy- [address]   Code Status: Full KARRIE CARLISLE 78y Female  MRN#: [id number]   CODE STATUS: FULL     SUBJECTIVE  Patient is a 78y old Female who presents with a chief complaint of Failure to thrive (11 Jul 2019 16:00)  Currently admitted to medicine with the primary diagnosis of sepsis    Today is hospital day 4d, and this morning she is doing well.  She lost her IV last night but she does not need it anymore for IV antibiotics.       OBJECTIVE  PAST MEDICAL & SURGICAL HISTORY  COPD (chronic obstructive pulmonary disease)  Hypertension  Schizo affective schizophrenia  H/O total hysterectomy    ALLERGIES:  No Known Allergies    MEDICATIONS:  STANDING MEDICATIONS  chlorhexidine 4% Liquid 1 Application(s) Topical <User Schedule>  ciprofloxacin     Tablet 500 milliGRAM(s) Oral every 12 hours  docusate sodium 100 milliGRAM(s) Oral daily  heparin  Injectable 5000 Unit(s) SubCutaneous every 8 hours  metroNIDAZOLE    Tablet 500 milliGRAM(s) Oral two times a day  NIFEdipine XL 30 milliGRAM(s) Oral daily  risperiDONE   Tablet 4 milliGRAM(s) Oral two times a day    PRN MEDICATIONS  magnesium hydroxide Suspension 30 milliLiter(s) Oral daily PRN      VITAL SIGNS: Last 24 Hours  T(C): 35.9 (12 Jul 2019 12:15), Max: 37 (11 Jul 2019 20:30)  T(F): 96.6 (12 Jul 2019 12:15), Max: 98.6 (11 Jul 2019 20:30)  HR: 61 (12 Jul 2019 12:15) (61 - 79)  BP: 139/78 (12 Jul 2019 12:15) (121/58 - 152/72)  BP(mean): --  RR: 18 (12 Jul 2019 12:15) (18 - 19)  SpO2: 94% (12 Jul 2019 07:37) (94% - 94%)    LABS:                        12.9   6.71  )-----------( 211      ( 12 Jul 2019 06:45 )             39.8     07-12    141  |  105  |  17  ----------------------------<  126<H>  3.6   |  24  |  0.8    Ca    9.1      12 Jul 2019 06:45      Culture - Blood (collected 11 Jul 2019 01:01)  Source: .Blood None  Preliminary Report (12 Jul 2019 06:01):    No growth to date.    Culture - Blood (collected 10 Jul 2019 07:15)  Source: .Blood None  Preliminary Report (11 Jul 2019 14:00):    No growth to date.      PHYSICAL EXAM:  GENERAL: NAD, AAOx3  HEENT:  EOMI, conjunctiva and sclera clear, No JVD  PULMONARY: Clear to auscultation bilaterally; No wheeze  CARDIOVASCULAR: Regular rate and rhythm; No murmurs, rubs, or gallops  GASTROINTESTINAL: Soft, Nontender, Nondistended  MUSCULOSKELETAL:  2+ Peripheral Pulses  NEUROLOGY: non-focal  SKIN: No rashes       ADMISSION SUMMARY  Patient is a 78y old Female who presents with a chief complaint of Failure to thrive (11 Jul 2019 16:00)  Currently admitted to medicine with the primary diagnosis of Sepsis      ASSESSMENT & PLAN  79y/o w/ past medical history of hypertension (not on any meds), COPD (not on any meds), and schizophrenia presenting w/ chief complaint of decreased PO intake and inability to ambulate found to have ROSSY and mild troponemia.      #Sepsis POA (leukocytosis, tachycardia) 2/2 Polymicrobial bacteremia   - + for E. Coli & gram + rods, gram + cocci in clusters  - bcx NG, on d/c plan for PO Cipro 500mg BID and Flagyl 500mg BID to complete 7 days (last day is 7/15)     #Acute kidney injury, resolved   - likely pre-renal due to poor PO intake   - Cr 1.6 on admission (baseline normal 1.1 in 2018), currently 1.1   - d/c fluids  -Trend BMP and send urine studies if no improvement  -No evidence of hydronephrosis on CT A/P    #Transaminitis, resolved   - AST/ALT: 28/38. ALP 82 now Bilirubin 0.6.  (downtrending liver enzymes)   - likely due to cholestatic liver injury due to sepsis   - CT A/P w/ contrast negative  - US RUQ ordered- gallbladder stones & sludge w/o sonographic evidence of acute cholecystitis, no acute intervention     #Mild Troponemia  - Trops 0.01 -> 0.03 --> 0.04 --> 0.7  - Pt denies chest pain, sob, palpitations, nausea or vomiting. EKG negative for ischemic changes  - CK-MB, 3.7   - TTE ordered- positive for pulmonary HTN (pt has COPD, not on home meds)     #Failure to thrive  - Decreased PO intake, decreased ambulation, dehydration  - Calorie count and dietary eval- recs appreciated   - Dash diet with 1:1 PO feeds, will add Ensure   - PT/Rehab evaluated   - Daughter will prefer pt to go back home on discharge, not SNF, with home help  - case management aware     #Abdominal wall hernia with loop of non-obstructed large bowel  -Incidental finding on CT scan  - Pt seen by surgery, f/u w/ Dr. Maynard, Matt as outpt     #Schizophrenia  - C/w Risperidone 4mg q12hr    #HTN   -not on home meds  -taking nifedipine here     #COPD  -not on home meds   -controlled for now, will continue to monitor     Diet: DASH  DVT PPX: Heparin SQ  Activity: out of bed to chair   Dispo:  home w/ home services, MAP appointment for Wednesday, 7/17 at 9:15 AM with Dr. Nash, pharmacy- [facility] [address]   Code Status: Full KARRIE CARLISLE 78y Female  MRN#: [id number]   CODE STATUS: FULL     SUBJECTIVE  Patient is a 78y old Female who presents with a chief complaint of Failure to thrive (11 Jul 2019 16:00)  Currently admitted to medicine with the primary diagnosis of sepsis    Today is hospital day 4d, and this morning she is doing well.  She lost her IV last night but she does not need it anymore for IV antibiotics.       OBJECTIVE  PAST MEDICAL & SURGICAL HISTORY  COPD (chronic obstructive pulmonary disease)  Hypertension  Schizo affective schizophrenia  H/O total hysterectomy    ALLERGIES:  No Known Allergies    MEDICATIONS:  STANDING MEDICATIONS  chlorhexidine 4% Liquid 1 Application(s) Topical <User Schedule>  ciprofloxacin     Tablet 500 milliGRAM(s) Oral every 12 hours  docusate sodium 100 milliGRAM(s) Oral daily  heparin  Injectable 5000 Unit(s) SubCutaneous every 8 hours  metroNIDAZOLE    Tablet 500 milliGRAM(s) Oral two times a day  NIFEdipine XL 30 milliGRAM(s) Oral daily  risperiDONE   Tablet 4 milliGRAM(s) Oral two times a day    PRN MEDICATIONS  magnesium hydroxide Suspension 30 milliLiter(s) Oral daily PRN      VITAL SIGNS: Last 24 Hours  T(C): 35.9 (12 Jul 2019 12:15), Max: 37 (11 Jul 2019 20:30)  T(F): 96.6 (12 Jul 2019 12:15), Max: 98.6 (11 Jul 2019 20:30)  HR: 61 (12 Jul 2019 12:15) (61 - 79)  BP: 139/78 (12 Jul 2019 12:15) (121/58 - 152/72)  BP(mean): --  RR: 18 (12 Jul 2019 12:15) (18 - 19)  SpO2: 94% (12 Jul 2019 07:37) (94% - 94%)    LABS:                        12.9   6.71  )-----------( 211      ( 12 Jul 2019 06:45 )             39.8     07-12    141  |  105  |  17  ----------------------------<  126<H>  3.6   |  24  |  0.8    Ca    9.1      12 Jul 2019 06:45      Culture - Blood (collected 11 Jul 2019 01:01)  Source: .Blood None  Preliminary Report (12 Jul 2019 06:01):    No growth to date.    Culture - Blood (collected 10 Jul 2019 07:15)  Source: .Blood None  Preliminary Report (11 Jul 2019 14:00):    No growth to date.      PHYSICAL EXAM:  GENERAL: NAD, AAOx3  HEENT:  EOMI, conjunctiva and sclera clear, No JVD  PULMONARY: Clear to auscultation bilaterally; No wheeze  CARDIOVASCULAR: Regular rate and rhythm; No murmurs, rubs, or gallops  GASTROINTESTINAL: Soft, Nontender, Nondistended  MUSCULOSKELETAL:  2+ Peripheral Pulses  NEUROLOGY: non-focal  SKIN: No rashes       ADMISSION SUMMARY  Patient is a 78y old Female who presents with a chief complaint of Failure to thrive (11 Jul 2019 16:00)  Currently admitted to medicine with the primary diagnosis of Sepsis      ASSESSMENT & PLAN  79y/o w/ past medical history of hypertension (not on any meds), COPD (not on any meds), and schizophrenia presenting w/ chief complaint of decreased PO intake and inability to ambulate found to have ROSSY and mild troponemia.      #Sepsis POA (leukocytosis, tachycardia) 2/2 Polymicrobial bacteremia   - + for E. Coli & gram + rods, gram + cocci in clusters  - bcx NG, on d/c plan for PO Cipro 500mg BID and Flagyl 500mg BID to complete 7 days (last day is 7/15)     #Acute kidney injury, resolved   - likely pre-renal due to poor PO intake   - Cr 1.6 on admission (baseline normal 1.1 in 2018), currently 1.1   - d/c fluids  -Trend BMP and send urine studies if no improvement  -No evidence of hydronephrosis on CT A/P    #Transaminitis, resolved   - AST/ALT: 28/38. ALP 82 now Bilirubin 0.6.  (downtrending liver enzymes)   - likely due to cholestatic liver injury due to sepsis   - CT A/P w/ contrast negative  - US RUQ ordered- gallbladder stones & sludge w/o sonographic evidence of acute cholecystitis, no acute intervention     #Mild Troponemia  - Trops 0.01 -> 0.03 -> 0.04 -> 0.7  - Pt denies chest pain, sob, palpitations, nausea or vomiting. EKG negative for ischemic changes  - CK-MB, 3.7   - TTE ordered- positive for pulmonary HTN (pt has COPD, not on home meds)     #Failure to thrive  - Decreased PO intake, decreased ambulation, dehydration  - Calorie count and dietary eval- recs appreciated   - Dash diet with 1:1 PO feeds, will add Ensure   - PT/Rehab evaluated   - Daughter will prefer pt to go back home on discharge, not SNF, with home help  - case management aware     #Abdominal wall hernia with loop of non-obstructed large bowel  -Incidental finding on CT scan  - Pt seen by surgery, f/u w/ Dr. Maynard, Matt as outpt     #Schizophrenia  - C/w Risperidone 4mg q12hr    #HTN   -not on home meds  -taking nifedipine here     #COPD  -not on home meds   -controlled for now, will continue to monitor     Diet: DASH  DVT PPX: Heparin SQ  Activity: out of bed to chair   Dispo:  home w/ home services, MAP appointment for Wednesday, 7/17 at 9:15 AM with Dr. Nash, pharmacy- [facility] [address]   Code Status: Full

## 2019-07-12 NOTE — CHART NOTE - NSCHARTNOTEFT_GEN_A_CORE
Registered Dietitian Note     3 day calorie count results:   Current diet:  DASH/TLC - no supplements were ordered during kcal count period, Ensure Enlive q 12hrs was added this morning (additional 700kcal, 40gm protein)    7/9: 705kcal, 33gm protein (3 meals documented, no supplements)   7/10: 500kcal, 15gm protein (2 meals documented, no supplements)  7/11: 1170kcal, 49gm protein (3 meals documented, no supplements)       Estimated needs:  Calories: 4730-3184 kcal/day (25-30 kcal/kg IBW)-morbid obesity considered  Protein: 50-60 gm/day (1-1.2 gm/kg IBW)  Fluid: 1 ml/kcal      Full f/u note completed yesterday, recommendations in chart.

## 2019-07-13 LAB
ANION GAP SERPL CALC-SCNC: 14 MMOL/L — SIGNIFICANT CHANGE UP (ref 7–14)
BUN SERPL-MCNC: 11 MG/DL — SIGNIFICANT CHANGE UP (ref 10–20)
CALCIUM SERPL-MCNC: 9.1 MG/DL — SIGNIFICANT CHANGE UP (ref 8.5–10.1)
CHLORIDE SERPL-SCNC: 102 MMOL/L — SIGNIFICANT CHANGE UP (ref 98–110)
CO2 SERPL-SCNC: 23 MMOL/L — SIGNIFICANT CHANGE UP (ref 17–32)
CREAT SERPL-MCNC: 0.8 MG/DL — SIGNIFICANT CHANGE UP (ref 0.7–1.5)
GLUCOSE SERPL-MCNC: 130 MG/DL — HIGH (ref 70–99)
HCT VFR BLD CALC: 40.3 % — SIGNIFICANT CHANGE UP (ref 37–47)
HGB BLD-MCNC: 13.1 G/DL — SIGNIFICANT CHANGE UP (ref 12–16)
MCHC RBC-ENTMCNC: 30.1 PG — SIGNIFICANT CHANGE UP (ref 27–31)
MCHC RBC-ENTMCNC: 32.5 G/DL — SIGNIFICANT CHANGE UP (ref 32–37)
MCV RBC AUTO: 92.6 FL — SIGNIFICANT CHANGE UP (ref 81–99)
NRBC # BLD: 0 /100 WBCS — SIGNIFICANT CHANGE UP (ref 0–0)
PLATELET # BLD AUTO: 252 K/UL — SIGNIFICANT CHANGE UP (ref 130–400)
POTASSIUM SERPL-MCNC: 3.7 MMOL/L — SIGNIFICANT CHANGE UP (ref 3.5–5)
POTASSIUM SERPL-SCNC: 3.7 MMOL/L — SIGNIFICANT CHANGE UP (ref 3.5–5)
RBC # BLD: 4.35 M/UL — SIGNIFICANT CHANGE UP (ref 4.2–5.4)
RBC # FLD: 14.9 % — HIGH (ref 11.5–14.5)
SODIUM SERPL-SCNC: 139 MMOL/L — SIGNIFICANT CHANGE UP (ref 135–146)
WBC # BLD: 7.54 K/UL — SIGNIFICANT CHANGE UP (ref 4.8–10.8)
WBC # FLD AUTO: 7.54 K/UL — SIGNIFICANT CHANGE UP (ref 4.8–10.8)

## 2019-07-13 PROCEDURE — 99233 SBSQ HOSP IP/OBS HIGH 50: CPT

## 2019-07-13 RX ADMIN — CHLORHEXIDINE GLUCONATE 1 APPLICATION(S): 213 SOLUTION TOPICAL at 05:50

## 2019-07-13 RX ADMIN — HEPARIN SODIUM 5000 UNIT(S): 5000 INJECTION INTRAVENOUS; SUBCUTANEOUS at 14:25

## 2019-07-13 RX ADMIN — HEPARIN SODIUM 5000 UNIT(S): 5000 INJECTION INTRAVENOUS; SUBCUTANEOUS at 05:50

## 2019-07-13 RX ADMIN — Medication 500 MILLIGRAM(S): at 05:50

## 2019-07-13 RX ADMIN — HEPARIN SODIUM 5000 UNIT(S): 5000 INJECTION INTRAVENOUS; SUBCUTANEOUS at 21:37

## 2019-07-13 RX ADMIN — RISPERIDONE 4 MILLIGRAM(S): 4 TABLET ORAL at 05:49

## 2019-07-13 RX ADMIN — Medication 30 MILLIGRAM(S): at 05:50

## 2019-07-13 NOTE — PROGRESS NOTE ADULT - SUBJECTIVE AND OBJECTIVE BOX
SUBJECTIVE  Today pt not very reliable for hx      OBJECTIVE  Vital Signs Last 24 Hrs  T(C): 35.6 (13 Jul 2019 12:28), Max: 36.4 (12 Jul 2019 21:50)  T(F): 96.1 (13 Jul 2019 12:28), Max: 97.5 (12 Jul 2019 21:50)  HR: 85 (13 Jul 2019 12:28) (76 - 85)  BP: 124/73 (13 Jul 2019 12:28) (124/73 - 143/69)  BP(mean): --  RR: 19 (13 Jul 2019 12:28) (18 - 19)  SpO2: --      PHYSICAL EXAM:    GENERAL: NAD, Awake alert, able to answer questions.  obese  HEENT: EOMI   PULMONARY: Clear to auscultation bilaterally   CARDIOVASCULAR: Regular rate and rhythm   GASTROINTESTINAL: Soft, Nontender, Nondistended, obese protuberant  MUSCULOSKELETAL:  no gross joint deform  NEUROLOGY: non-focal  SKIN: No rashes or lesions      LABS:               12.9   6.71  )-----------( 211      ( 12 Jul 2019 06:45 )             39.8     07-12    141  |  105  |  17  ----------------------------<  126<H>  3.6   |  24  |  0.8    Ca    9.1      12 Jul 2019 06:45        Culture - Blood (collected 11 Jul 2019 01:01)  Source: .Blood None  Preliminary Report (12 Jul 2019 06:01):    No growth to date.    Culture - Blood (collected 10 Jul 2019 07:15)  Source: .Blood None  Preliminary Report (11 Jul 2019 14:00):    No growth to date.        CT AP with Anterior abdominal wall hernia containing a loop of nonobstructed bowel. Additional findings after attending review: The right colon appears thickened despite underdistention. Correlate for colitis.    US with GB sludge    CXR no PNA

## 2019-07-14 LAB
ANION GAP SERPL CALC-SCNC: 14 MMOL/L — SIGNIFICANT CHANGE UP (ref 7–14)
BUN SERPL-MCNC: 12 MG/DL — SIGNIFICANT CHANGE UP (ref 10–20)
CALCIUM SERPL-MCNC: 9.2 MG/DL — SIGNIFICANT CHANGE UP (ref 8.5–10.1)
CHLORIDE SERPL-SCNC: 102 MMOL/L — SIGNIFICANT CHANGE UP (ref 98–110)
CO2 SERPL-SCNC: 25 MMOL/L — SIGNIFICANT CHANGE UP (ref 17–32)
CREAT SERPL-MCNC: 0.8 MG/DL — SIGNIFICANT CHANGE UP (ref 0.7–1.5)
CULTURE RESULTS: SIGNIFICANT CHANGE UP
CULTURE RESULTS: SIGNIFICANT CHANGE UP
GLUCOSE SERPL-MCNC: 107 MG/DL — HIGH (ref 70–99)
HCT VFR BLD CALC: 39.5 % — SIGNIFICANT CHANGE UP (ref 37–47)
HGB BLD-MCNC: 12.7 G/DL — SIGNIFICANT CHANGE UP (ref 12–16)
MCHC RBC-ENTMCNC: 30.1 PG — SIGNIFICANT CHANGE UP (ref 27–31)
MCHC RBC-ENTMCNC: 32.2 G/DL — SIGNIFICANT CHANGE UP (ref 32–37)
MCV RBC AUTO: 93.6 FL — SIGNIFICANT CHANGE UP (ref 81–99)
NRBC # BLD: 0 /100 WBCS — SIGNIFICANT CHANGE UP (ref 0–0)
PLATELET # BLD AUTO: 286 K/UL — SIGNIFICANT CHANGE UP (ref 130–400)
POTASSIUM SERPL-MCNC: 4.1 MMOL/L — SIGNIFICANT CHANGE UP (ref 3.5–5)
POTASSIUM SERPL-SCNC: 4.1 MMOL/L — SIGNIFICANT CHANGE UP (ref 3.5–5)
RBC # BLD: 4.22 M/UL — SIGNIFICANT CHANGE UP (ref 4.2–5.4)
RBC # FLD: 15.1 % — HIGH (ref 11.5–14.5)
SODIUM SERPL-SCNC: 141 MMOL/L — SIGNIFICANT CHANGE UP (ref 135–146)
SPECIMEN SOURCE: SIGNIFICANT CHANGE UP
SPECIMEN SOURCE: SIGNIFICANT CHANGE UP
WBC # BLD: 8.19 K/UL — SIGNIFICANT CHANGE UP (ref 4.8–10.8)
WBC # FLD AUTO: 8.19 K/UL — SIGNIFICANT CHANGE UP (ref 4.8–10.8)

## 2019-07-14 PROCEDURE — 99233 SBSQ HOSP IP/OBS HIGH 50: CPT

## 2019-07-14 RX ADMIN — RISPERIDONE 4 MILLIGRAM(S): 4 TABLET ORAL at 18:15

## 2019-07-14 RX ADMIN — Medication 500 MILLIGRAM(S): at 05:41

## 2019-07-14 RX ADMIN — Medication 30 MILLIGRAM(S): at 05:41

## 2019-07-14 RX ADMIN — HEPARIN SODIUM 5000 UNIT(S): 5000 INJECTION INTRAVENOUS; SUBCUTANEOUS at 21:45

## 2019-07-14 RX ADMIN — Medication 500 MILLIGRAM(S): at 18:14

## 2019-07-14 RX ADMIN — RISPERIDONE 4 MILLIGRAM(S): 4 TABLET ORAL at 05:42

## 2019-07-14 RX ADMIN — HEPARIN SODIUM 5000 UNIT(S): 5000 INJECTION INTRAVENOUS; SUBCUTANEOUS at 13:09

## 2019-07-14 RX ADMIN — HEPARIN SODIUM 5000 UNIT(S): 5000 INJECTION INTRAVENOUS; SUBCUTANEOUS at 05:41

## 2019-07-14 NOTE — PROGRESS NOTE ADULT - SUBJECTIVE AND OBJECTIVE BOX
SUBJECTIVE   pt not very reliable for hx      OBJECTIVE  Vital Signs Last 24 Hrs  T(C): 35.6 (14 Jul 2019 04:45), Max: 35.9 (13 Jul 2019 21:08)  T(F): 96 (14 Jul 2019 04:45), Max: 96.6 (13 Jul 2019 21:08)  HR: 84 (14 Jul 2019 04:45) (80 - 85)  BP: 161/78 (14 Jul 2019 04:45) (124/73 - 170/74)  BP(mean): --  RR: 18 (14 Jul 2019 04:45) (18 - 19)  SpO2: 92% (14 Jul 2019 08:10) (92% - 92%)    PHYSICAL EXAM:    GENERAL: NAD, Awake alert, able to answer questions.  obese  HEENT: EOMI   PULMONARY: Clear to auscultation bilaterally   CARDIOVASCULAR: Regular rate and rhythm   GASTROINTESTINAL: Soft, Nontender, Nondistended, obese protuberant  MUSCULOSKELETAL:  no gross joint deform  NEUROLOGY: non-focal  SKIN: No rashes or lesions      LABS:                            12.7   8.19  )-----------( 286      ( 14 Jul 2019 08:09 )             39.5     07-14    141  |  102  |  12  ----------------------------<  107<H>  4.1   |  25  |  0.8    Ca    9.2      14 Jul 2019 08:09       Culture - Blood (collected 11 Jul 2019 01:01)  Source: .Blood None  Preliminary Report (12 Jul 2019 06:01):    No growth to date.    Culture - Blood (collected 10 Jul 2019 07:15)  Source: .Blood None  Preliminary Report (11 Jul 2019 14:00):    No growth to date.        CT AP with Anterior abdominal wall hernia containing a loop of nonobstructed bowel. Additional findings after attending review: The right colon appears thickened despite underdistention. Correlate for colitis.    US with GB sludge    CXR no PNA

## 2019-07-14 NOTE — PROGRESS NOTE ADULT - NSHPATTENDINGPLANDISCUSS_GEN_ALL_CORE
patient, resident, RN, CM
Medicine floor resident
Medicine floor resident
patient, resident, RN, CM
resident, RN, CM, patient

## 2019-07-15 ENCOUNTER — TRANSCRIPTION ENCOUNTER (OUTPATIENT)
Age: 79
End: 2019-07-15

## 2019-07-15 VITALS
TEMPERATURE: 97 F | HEART RATE: 96 BPM | DIASTOLIC BLOOD PRESSURE: 68 MMHG | RESPIRATION RATE: 18 BRPM | SYSTOLIC BLOOD PRESSURE: 126 MMHG

## 2019-07-15 PROBLEM — Z00.00 ENCOUNTER FOR PREVENTIVE HEALTH EXAMINATION: Status: ACTIVE | Noted: 2019-07-15

## 2019-07-15 LAB
CULTURE RESULTS: SIGNIFICANT CHANGE UP
SPECIMEN SOURCE: SIGNIFICANT CHANGE UP

## 2019-07-15 PROCEDURE — 99238 HOSP IP/OBS DSCHRG MGMT 30/<: CPT

## 2019-07-15 RX ORDER — NIFEDIPINE 30 MG
1 TABLET, EXTENDED RELEASE 24 HR ORAL
Qty: 30 | Refills: 0
Start: 2019-07-15 | End: 2019-08-13

## 2019-07-15 RX ORDER — RISPERIDONE 4 MG/1
1 TABLET ORAL
Qty: 60 | Refills: 0
Start: 2019-07-15 | End: 2019-08-13

## 2019-07-15 RX ORDER — NIFEDIPINE 30 MG
1 TABLET, EXTENDED RELEASE 24 HR ORAL
Qty: 0 | Refills: 0 | DISCHARGE
Start: 2019-07-15

## 2019-07-15 RX ORDER — RISPERIDONE 4 MG/1
1 TABLET ORAL
Qty: 0 | Refills: 0 | DISCHARGE

## 2019-07-15 RX ADMIN — RISPERIDONE 4 MILLIGRAM(S): 4 TABLET ORAL at 18:00

## 2019-07-15 RX ADMIN — HEPARIN SODIUM 5000 UNIT(S): 5000 INJECTION INTRAVENOUS; SUBCUTANEOUS at 05:38

## 2019-07-15 RX ADMIN — RISPERIDONE 4 MILLIGRAM(S): 4 TABLET ORAL at 05:37

## 2019-07-15 RX ADMIN — HEPARIN SODIUM 5000 UNIT(S): 5000 INJECTION INTRAVENOUS; SUBCUTANEOUS at 15:02

## 2019-07-15 RX ADMIN — Medication 500 MILLIGRAM(S): at 18:00

## 2019-07-15 RX ADMIN — Medication 500 MILLIGRAM(S): at 05:37

## 2019-07-15 RX ADMIN — CHLORHEXIDINE GLUCONATE 1 APPLICATION(S): 213 SOLUTION TOPICAL at 05:54

## 2019-07-15 RX ADMIN — Medication 30 MILLIGRAM(S): at 05:37

## 2019-07-15 NOTE — PROGRESS NOTE ADULT - ASSESSMENT
78 F  hypertension (not on any meds), COPD (not on any meds), and schizophrenia here w  ROSSY and mild troponemia, was found to have sepsis / bacteremia.     #Sepsis POA (leukocytosis, tachycardia) 2/2 bacteremia (unk etiology)  E.Coli bacteremia sens to Cipro from 7/7 bld cx,  repeat bld cx 7/11 NGTD  ID recommended PO Cipro & Flagyl course    #Acute kidney injury, resolved   - likely pre-renal due to poor PO intake       #Transaminitis, resolved    downtrending liver enzymes   - likely due to cholestatic liver injury due to sepsis   - CT A/P w/ contrast negative  - US RUQ ordered- gallbladder stones & sludge w/o sonographic evidence of acute cholecystitis, no acute intervention     #Mild Troponemia  TTE showed pulm HTN  likely troponin elevated related to sepsis/demand leak    #Failure to thrive  - Decreased PO intake  Ensure meal supplements added to diet regimen    #Abdominal wall hernia with loop of non-obstructed large bowel  -Incidental finding on CT scan  - Pt seen by surgery, f/u w/ Matt Viveros as outpt     #Schizophrenia  - C/w Risperidone 4mg q12hr    #HTN   -not on home meds  -taking nifedipine here     #COPD  -not on home meds   -controlled for now, will continue to monitor      #Mild Hypokalemia  repleted, resolved.     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
78 F  hypertension (not on any meds), COPD (not on any meds), and schizophrenia here w  ROSSY and mild troponemia, was found to have sepsis / bacteremia.     #Sepsis POA (leukocytosis, tachycardia) 2/2 bacteremia (unk etiology)  E.Coli bacteremia sens to Cipro from 7/7 bld cx,  repeat bld cx 7/11 NGTD  ID recommended PO Cipro & Flagyl for 7 days course      #Acute kidney injury, resolved   - likely pre-renal due to poor PO intake       #Transaminitis, resolved    downtrending liver enzymes   - likely due to cholestatic liver injury due to sepsis   - CT A/P w/ contrast negative  - US RUQ ordered- gallbladder stones & sludge w/o sonographic evidence of acute cholecystitis, no acute intervention     #Mild Troponemia  TTE showed pulm HTN  likely troponin elevated related to sepsis/demand leak    #Failure to thrive  - Decreased PO intake  Ensure meal supplements added to diet regimen    #Abdominal wall hernia with loop of non-obstructed large bowel  -Incidental finding on CT scan  - Pt seen by surgery, f/u w/ Matt Viveros as outpt     #Schizophrenia  - C/w Risperidone 4mg q12hr    #HTN   -not on home meds  -taking nifedipine here     #COPD  -not on home meds   -controlled for now, will continue to monitor      #Mild Hypokalemia  repleted, resolved.     #Obesity   - BMI 36, weight loss counselling     #Chronic HFpEF   - compensated, euvolemic    #Mild pulm HTN   - likely combination of WHO Group II and III     #Progress Note Handoff  Pending (specify):  none  Family discussion: none  Disposition: Home__x_/SNF___/Other________/Unknown at this time________  Anticipate for  Monday per CSM
78 F  hypertension (not on any meds), COPD (not on any meds), and schizophrenia here w  ROSSY and mild troponemia, was found to have sepsis / bacteremia.     #Sepsis POA (leukocytosis, tachycardia) 2/2 bacteremia (unk etiology)  E.Coli bacteremia sens to Cipro from 7/7 bld cx,  repeat bld cx 7/11 NGTD  ID recommended PO Cipro & Flagyl for 7 days course    Possible dispo as soon as today    #Acute kidney injury, resolved   - likely pre-renal due to poor PO intake       #Transaminitis, resolved    downtrending liver enzymes   - likely due to cholestatic liver injury due to sepsis   - CT A/P w/ contrast negative  - US RUQ ordered- gallbladder stones & sludge w/o sonographic evidence of acute cholecystitis, no acute intervention     #Mild Troponemia  TTE showed pulm HTN  likely troponin elevated related to sepsis/demand leak    #Failure to thrive  - Decreased PO intake  Ensure meal supplements added to diet regimen    #Abdominal wall hernia with loop of non-obstructed large bowel  -Incidental finding on CT scan  - Pt seen by surgery, f/u w/ Matt Viveros as outpt     #Schizophrenia  - C/w Risperidone 4mg q12hr    #HTN   -not on home meds  -taking nifedipine here     #COPD  -not on home meds   -controlled for now, will continue to monitor      #Mild Hypokalemia  repleted, resolved.     #Obesity   - BMI 36, weight loss counselling     #Chronic HFpEF   - compensated, euvolemic    #Mild pulm HTN   - likely combination of WHO Group II and III     #Progress Note Handoff  Pending (specify):  repeat blood cx final results_  Family discussion: rosa pt, AOx3  Disposition: Home__x_/SNF___/Other________/Unknown at this time________  as soon as today
ASSESSMENT  77y/o w/ past medical history of hypertension, COPD, schizophrenia presenting w/ chief complaint of decreased PO intake and inability to ambulate found to have Ecoli BSI    PROBLEMS  #Ecoli bacteremia (+7/7, S cipro, R bactrim), no sepsis on admission    Unclear etiology possible GI translocation as CT with possible colitis. UCX NG    CT AP with Anterior abdominal wall hernia containing a loop of nonobstructed bowel. Additional findings after attending review: The right colon appears thickened despite underdistention. Correlate for colitis.    US with GB sludge    CXR no PNA  Transaminitis downtrending   #Obesity BMI (kg/m2): 35.6 (07-08-19 @ 22:45)    RECOMMENDATIONS  - CHANGE zosyn to Ceftriaxone 2g q24h IV and PO flagyl 500mg BID  - F/u repeat BCX  - If repeat bcx NG, on d/c plan for PO Cipro 500mg BID and Flagyl 500mg BID to complete 7 days (7/15)    Spectra [phone]
ASSESSMENT  79y/o w/ past medical history of hypertension, COPD, schizophrenia presenting w/ chief complaint of decreased PO intake and inability to ambulate found to have Ecoli BSI    PROBLEMS  #Ecoli bacteremia (+7/7, S cipro, R bactrim), no sepsis on admission    Unclear etiology possible GI translocation as CT with possible colitis. UCX NG    CT AP with Anterior abdominal wall hernia containing a loop of nonobstructed bowel. Additional findings after attending review: The right colon appears thickened despite underdistention. Correlate for colitis.    US with GB sludge    CXR no PNA  Transaminitis downtrending   #Obesity BMI (kg/m2): 35.6 (07-08-19 @ 22:45)    RECOMMENDATIONS  - PO Cipro 500mg BID and Flagyl 500mg BID to complete 7 days (7/15)    Spectra [phone]
78 F  hypertension (not on any meds), COPD (not on any meds), and schizophrenia here w  ROSSY and mild troponemia, was found to have sepsis / bacteremia.     #Sepsis POA (leukocytosis, tachycardia) 2/2 bacteremia (unk etiology)  E.Coli bacteremia sens to Cipro from 7/7 bld cx,  repeat bld cx 7/11 NGTD  ID recommended PO Cipro & Flagyl for 7 days course      #Acute kidney injury, resolved   - likely pre-renal due to poor PO intake       #Transaminitis, resolved    downtrending liver enzymes   - likely due to cholestatic liver injury due to sepsis   - CT A/P w/ contrast negative  - US RUQ ordered- gallbladder stones & sludge w/o sonographic evidence of acute cholecystitis, no acute intervention     #Mild Troponemia  TTE showed pulm HTN  likely troponin elevated related to sepsis/demand leak    #Failure to thrive  - Decreased PO intake  Ensure meal supplements added to diet regimen    #Abdominal wall hernia with loop of non-obstructed large bowel  -Incidental finding on CT scan  - Pt seen by surgery, f/u w/ Matt Viveros as outpt     #Schizophrenia  - C/w Risperidone 4mg q12hr    #HTN   -not on home meds  -taking nifedipine here     #COPD  -not on home meds   -controlled for now, will continue to monitor      #Mild Hypokalemia  repleted, resolved.     #Obesity   - BMI 36, weight loss counselling     #Chronic HFpEF   - compensated, euvolemic    #Mild pulm HTN   - likely combination of WHO Group II and III     #Progress Note Handoff  Pending (specify):  none  Family discussion: none  Disposition: Home__x_/SNF___/Other________/Unknown at this time________  Anticipate for  Monday per CSM

## 2019-07-15 NOTE — DISCHARGE NOTE PROVIDER - CARE PROVIDER_API CALL
Junior Maynard)  [facility]  [address] [place] [address], [address] [place] [address]  Phone: [phone]  Fax: [phone]  Follow Up Time: 1 week

## 2019-07-15 NOTE — PROGRESS NOTE ADULT - PROVIDER SPECIALTY LIST ADULT
Hospitalist
Infectious Disease
Infectious Disease
Internal Medicine
Hospitalist

## 2019-07-15 NOTE — PROGRESS NOTE ADULT - SUBJECTIVE AND OBJECTIVE BOX
SUBJECTIVE   pt not very reliable for hx  ; no complaints.  ready to go home    OBJECTIVE  Vital Signs Last 24 Hrs  T(C): 36.1 (15 Jul 2019 05:00), Max: 36.1 (15 Jul 2019 05:00)  T(F): 97 (15 Jul 2019 05:00), Max: 97 (15 Jul 2019 05:00)  HR: 72 (15 Jul 2019 05:00) (72 - 81)  BP: 141/67 (15 Jul 2019 05:00) (141/67 - 158/76)  BP(mean): --  RR: 18 (15 Jul 2019 05:00) (18 - 18)  SpO2: --    PHYSICAL EXAM:    GENERAL: NAD, Awake alert, able to answer questions.  obese  HEENT: EOMI   PULMONARY: Clear to auscultation bilaterally   CARDIOVASCULAR: Regular rate and rhythm   GASTROINTESTINAL: Soft, Nontender, Nondistended, obese protuberant  MUSCULOSKELETAL:  no gross joint deform  NEUROLOGY: non-focal  SKIN: No rashes or lesions      LABS:                            12.7   8.19  )-----------( 286      ( 14 Jul 2019 08:09 )             39.5     07-14    141  |  102  |  12  ----------------------------<  107<H>  4.1   |  25  |  0.8    Ca    9.2      14 Jul 2019 08:09       Culture - Blood (collected 11 Jul 2019 01:01)  Source: .Blood None  Preliminary Report (12 Jul 2019 06:01):    No growth to date.    Culture - Blood (collected 10 Jul 2019 07:15)  Source: .Blood None  Preliminary Report (11 Jul 2019 14:00):    No growth to date.        CT AP with Anterior abdominal wall hernia containing a loop of nonobstructed bowel. Additional findings after attending review: The right colon appears thickened despite underdistention. Correlate for colitis.    US with GB sludge    CXR no PNA

## 2019-07-15 NOTE — PROGRESS NOTE ADULT - REASON FOR ADMISSION
Failure to thrive

## 2019-07-15 NOTE — CHART NOTE - NSCHARTNOTEFT_GEN_A_CORE
<<<RESIDENT DISCHARGE NOTE>>>     KARRIE CARLISLE  [id number]    VITAL SIGNS:  T(F): 96.9 (07-15-19 @ 12:21), Max: 97 (07-15-19 @ 05:00)  HR: 96 (07-15-19 @ 12:21)  BP: 126/68 (07-15-19 @ 12:21)      PHYSICAL EXAMINATION:  GENERAL: NAD, AAOx3  HEENT:  EOMI, conjunctiva and sclera clear, No JVD  PULMONARY: Clear to auscultation bilaterally; No wheeze  CARDIOVASCULAR: Regular rate and rhythm; No murmurs, rubs, or gallops  GASTROINTESTINAL: Soft, Nontender, Nondistended  MUSCULOSKELETAL:  2+ Peripheral Pulses  SKIN: No rashes       TEST RESULTS:                        12.7   8.19  )-----------( 286      ( 14 Jul 2019 08:09 )             39.5       07-14    141  |  102  |  12  ----------------------------<  107<H>  4.1   |  25  |  0.8    Ca    9.2      14 Jul 2019 08:09        FINAL DISCHARGE INTERVIEW:  Resident(s) Present: (Name: Ml Rodriguez, RN Present: (Name:  Lorraine)    DISCHARGE MEDICATION RECONCILIATION  reviewed with Attending (Name: Juan Carlos Hall MD)    DISPOSITION:   [  ] Home,    [  ] Home with Visiting Nursing Services,   [  x ]  SNF/ NH,    [   ] Acute Rehab (4A),   [   ] Other (Specify:_________)

## 2019-07-15 NOTE — DISCHARGE NOTE PROVIDER - NSDCCPCAREPLAN_GEN_ALL_CORE_FT
PRINCIPAL DISCHARGE DIAGNOSIS  Diagnosis: Gram-negative bacteremia  Assessment and Plan of Treatment: You have completed a course of antibitoics as per infectious disease as inpatient. Please follow up with a primary medical doctor within one week of discharge. If symptoms worsen or reocur, please call 911 or report to the nearest Emergency Medicine Department.      SECONDARY DISCHARGE DIAGNOSES  Diagnosis: Abdominal wall hernia  Assessment and Plan of Treatment: Patient evaluate by surgery who recommend edoutpatient follow up with Dr. Caesar Gutierrez as outpatient.    Diagnosis: Adult failure to thrive  Assessment and Plan of Treatment: Continue the followng diet: Mechanical soft diet ; Supplementation: Ensure Enlive twice a day. Please continue follow up wit nutritionist. Please follow up with a primary medical doctor within one week of discharge. If symptoms worsen or reocur, please call 911 or report to the nearest Emergency Medicine Department.

## 2019-07-15 NOTE — DISCHARGE NOTE PROVIDER - HOSPITAL COURSE
79 yo F     PMH: HTN (not on any meds), COPD (not on any meds), and schizophrenia     cc: Presented with ROSSY and Mild tropenemia. Patient found to be septci with bacteremia .    Patient was treated for the following conditions:    1. Sepsis present on admission likely due to bacteremia - resolved    2. E.Coli bacteremia: Patient completed course of Cipro and Flagyl 7/15/19. Repeat blood cultures negative     3. Pre-renal ROSSY likely due to dehydration: resolved.     4. Transaminitis: resolved. Likely due to cholestatic liver injury due to sepsis. CT A/P w/ contrast negative. US RUQ demonstrated gallbladder stones & sludge w/o sonographic evidence of acute cholecystitis. No acute intervention indicated    5. Failure to Thrive: Ensure meal supplements added to diet regimen    6. Abdominal wall hernia with loop of non-obstructed large bowel: Incidental finding on CT scan. Patient evaluate by surgery who recommend edoutpatient follow up with Dr. Caesar Gutierrez as outpatient.     7. Schizophrenia: Continue Risperidone 4mg q12hr    8. HTN: Continue Nifedipine

## 2019-07-16 LAB
CULTURE RESULTS: SIGNIFICANT CHANGE UP
SPECIMEN SOURCE: SIGNIFICANT CHANGE UP

## 2019-07-17 ENCOUNTER — APPOINTMENT (OUTPATIENT)
Dept: GERIATRICS | Facility: CLINIC | Age: 79
End: 2019-07-17

## 2019-07-22 DIAGNOSIS — I50.32 CHRONIC DIASTOLIC (CONGESTIVE) HEART FAILURE: ICD-10-CM

## 2019-07-22 DIAGNOSIS — J44.9 CHRONIC OBSTRUCTIVE PULMONARY DISEASE, UNSPECIFIED: ICD-10-CM

## 2019-07-22 DIAGNOSIS — I11.0 HYPERTENSIVE HEART DISEASE WITH HEART FAILURE: ICD-10-CM

## 2019-07-22 DIAGNOSIS — Z87.891 PERSONAL HISTORY OF NICOTINE DEPENDENCE: ICD-10-CM

## 2019-07-22 DIAGNOSIS — E66.9 OBESITY, UNSPECIFIED: ICD-10-CM

## 2019-07-22 DIAGNOSIS — R53.1 WEAKNESS: ICD-10-CM

## 2019-07-22 DIAGNOSIS — E87.6 HYPOKALEMIA: ICD-10-CM

## 2019-07-22 DIAGNOSIS — A41.51 SEPSIS DUE TO ESCHERICHIA COLI [E. COLI]: ICD-10-CM

## 2019-07-22 DIAGNOSIS — E86.0 DEHYDRATION: ICD-10-CM

## 2019-07-22 DIAGNOSIS — F25.9 SCHIZOAFFECTIVE DISORDER, UNSPECIFIED: ICD-10-CM

## 2019-07-22 DIAGNOSIS — I27.22 PULMONARY HYPERTENSION DUE TO LEFT HEART DISEASE: ICD-10-CM

## 2019-07-22 DIAGNOSIS — R62.7 ADULT FAILURE TO THRIVE: ICD-10-CM

## 2019-07-22 DIAGNOSIS — I27.23 PULMONARY HYPERTENSION DUE TO LUNG DISEASES AND HYPOXIA: ICD-10-CM

## 2019-07-22 DIAGNOSIS — R74.0 NONSPECIFIC ELEVATION OF LEVELS OF TRANSAMINASE AND LACTIC ACID DEHYDROGENASE [LDH]: ICD-10-CM

## 2019-07-22 DIAGNOSIS — N17.9 ACUTE KIDNEY FAILURE, UNSPECIFIED: ICD-10-CM

## 2019-08-27 NOTE — ED ADULT TRIAGE NOTE - NS ED NURSE BANDS TYPE
HPI     CTL Fit  Pt here for fitting states no changes since last visit   Just here to  contacts,pt has worn before     Last edited by Hay Kent, OD on 8/27/2019  2:26 PM. (History)            Assessment /Plan     For exam results, see Encounter Report.    Encounter for fitting or adjustment of spectacles or contact lenses      Good fit and Va      Note changes CL Rx  RTC 1 year  Discussed above and answered questions.                    Name band;

## 2021-06-19 NOTE — PATIENT PROFILE ADULT - NSASFALLNEEDSASSIST_GEN_A_NUR
GENERAL: in mild distress due to pain, non-toxic appearing   HEENT:  Atraumatic, Normocephalic, Conjunctiva and sclera clear, oral mucosa moist, clear w/o any exudate   NECK: Supple, No JVD  CHEST/LUNG: Clear to auscultation bilaterally; No wheeze  HEART: Regular rate and rhythm; No murmurs, rubs, or gallops  ABDOMEN: Soft, tender to palpation over epigastric and lower quadrant, Nondistended; Bowel sounds present  EXTREMITIES: 1+ pitting edema, 1>2  PSYCH: AAOx3  NEUROLOGY: non-focal  SKIN: No rashes or lesions
yes

## 2021-09-23 NOTE — ED ADULT TRIAGE NOTE - WEIGHT METHOD
Midline ordered for IV access by Dr. Calvert.    [id number] Bard Powerglide Pro Midline Catheter inserted to LUE basilic vein 18g 8cm per protocol using US guidance, has blood return & flushes easily. Pt albino well. Floor RN Jane notified.   
stated

## 2022-08-30 NOTE — PHYSICAL THERAPY INITIAL EVALUATION ADULT - IMPAIRED TRANSFERS: SIT/STAND, REHAB EVAL
Referred by: Dmitriy Roach MD; Medical Diagnosis (from order):    Diagnosis Information      Diagnosis    728.87 (ICD-9-CM) - R29.898 (ICD-10-CM) - Left hand weakness              Visit #30  Visit Type: Daily Treatment Note  Patient alert and oriented X3.    SUBJECTIVE                                                                                                               Ulnar aspect of hand feels like a blister sensation. Continues to feel overall improvement in his hand. Reports ability to snap his fingers within the last two days.     OBJECTIVE                                                                                                                        TREATMENT                                                                                                                  Therapeutic Exercise:  Performed following exercises at the clinic  EZ  peg removal with black spring in third position x 25 pegs  Red tputty weight bear and cone twist  Green clothespin  of sponge while holding gripper with MF, RF, SF (white spring in first position)  Green tbar bend down and up x 10 reps each  Red tputty button find      Skilled input: verbal instruction/cues and tactile instruction/cues    Writer verbally educated and received verbal consent for hand placement, positioning of patient, and techniques to be performed today from patient for therapist position for techniques and hand placement and palpation for techniques as described above and how they are pertinent to the patient's plan of care.    Home Exercise Program/Education Materials: See above     ASSESSMENT                                                                                                             Patient continues to demonstrate steady improvements with strength, motion and ability to tolerate progression of exercises. Plan to recheck in three weeks and progress HEP as appropriate. Patient in agreement with plan.     Continue  1x every three weeks with focus on above areas. Patient in agreement with plan.   Pain/symptoms after session (out of 10): 0  Patient Education:   Results of above outlined education: Verbalizes understanding, Demonstrates understanding and Needs reinforcement      PLAN                                                                                                                           Suggestions for next session as indicated: Continue POC         Therapy procedure time and total treatment time can be found documented on the Time Entry flowsheet   decreased strength/impaired coordination/impaired balance

## 2023-08-28 NOTE — ED PROVIDER NOTE - DISCHARGE DATE
Cardiovascular Disease Progress Note  Date of service: 23 @ 17:34  Covering Dr. Gaspar  Overnight events: No acute events overnight.  Pt is in no distress. Saturating well on NC. Denies chest pain.   Otherwise review of systems negative    Objective Findings:  T(C): 37.1 (23 @ 16:47), Max: 37.1 (23 @ 16:47)  HR: 93 (23 @ 16:53) (73 - 96)  BP: 126/77 (23 @ 16:47) (107/64 - 126/77)  RR: 19 (23 @ 16:53) (18 - 20)  SpO2: 91% (23 @ 16:53) (86% - 96%)  Wt(kg): --  Daily     Daily Weight in k.8 (28 Aug 2023 12:00)      Physical Exam:  Gen: NAD; Patient resting comfortably  HEENT: EOMI, Normocephalic/ atraumatic  CV: RRR, normal S1 + S2, no m/r/g  Lungs:  Normal respiratory effort; clear to auscultation bilaterally  Abd: soft, non-tender; bowel sounds present  Ext: No edema; warm and well perfused    Telemetry:  Sinus, NSVT overnight.     Laboratory Data:                        9.7    2.31  )-----------( 200      ( 28 Aug 2023 07:47 )             30.6         137  |  97  |  24<H>  ----------------------------<  105<H>  3.9   |  28  |  1.20    Ca    8.8      28 Aug 2023 07:45  Phos  3.0       Mg     2.4                     Inpatient Medications:  MEDICATIONS  (STANDING):  buPROPion XL (24-Hour) . 300 milliGRAM(s) Oral daily  enoxaparin Injectable 40 milliGRAM(s) SubCutaneous every 24 hours  escitalopram 10 milliGRAM(s) Oral daily  escitalopram 5 milliGRAM(s) Oral daily  exemestane 25 milliGRAM(s) Oral daily  furosemide   Injectable 40 milliGRAM(s) IV Push daily  mirtazapine 7.5 milliGRAM(s) Oral at bedtime  simvastatin 20 milliGRAM(s) Oral at bedtime  tamsulosin 0.4 milliGRAM(s) Oral at bedtime      Assessment:  89-year-old female w/ PMH of HLD and breast cancer receiving oral chemotherapy who is presenting following a one week history of SOB. She states this is new for her and has been constant. She denies fever, chills, chest pain, cough, nausea, vomiting, and dysuria. No orthopnea or LE swelling. No sick contacts. She presented to the ED via EMS after being found hypoxic with O2 saturation in the low 80s at her outpatient oncologist office.    1. Shortness of Breath  - ECG NSR with no ischemia noted  - COVID/RVP/legionella/Strep PNA negative  - afebile, no leukocytosis  - CT negative for PE but reveals ? pulm edema and cardiomegaly vs pneumonitis.   - TTE shows preserved EF, no WMA and mild MS  - D-Dimer 836--> US of RLE negative for DVT, PE neg as noted above  - DDx includes ADHF vs. Atypical PNA vs. Pneumonitis (as per Heme/Onc, Ibrance can cause ILD/pneumonitis)  - Supplemental oxygen as needed  - Pulm following  - Continue trial of diuresis with c/w Lasix 40mg IVP daily, though appears less likely that SOB is related to pulm edema      2. HLD  - c/w simvastatin 20mg PO daily    3. Breast CA  - Heme/Onc following    4. NSVT  - Pt asymptomatic  - Continue to monitor.         Over 25 minutes spent on total encounter; more than 50% of the visit was spent counseling and/or coordinating care by the attending physician.      Filipe Jin DO [facility]  Cardiovascular Disease  [phone] 22-Sep-2018

## 2024-01-11 NOTE — ED ADULT TRIAGE NOTE - TEMPERATURE IN FAHRENHEIT (DEGREES F)
98.4

## 2024-12-16 NOTE — ED ADULT NURSE NOTE - OBJECTIVE STATEMENT
----- Message from Morenita Herman MD sent at 12/15/2024  5:51 PM CST -----  Have PSR's contact pt to get him a follow up appointment in the next 2-4 months.  _________________   pt states she is fine and has the right to refuse anything she doesn't want  daughter states her rufusal to take meds is effecting her judgement